# Patient Record
Sex: FEMALE | Race: WHITE | NOT HISPANIC OR LATINO
[De-identification: names, ages, dates, MRNs, and addresses within clinical notes are randomized per-mention and may not be internally consistent; named-entity substitution may affect disease eponyms.]

---

## 2022-09-25 ENCOUNTER — NON-APPOINTMENT (OUTPATIENT)
Age: 57
End: 2022-09-25

## 2022-09-26 ENCOUNTER — APPOINTMENT (OUTPATIENT)
Dept: BREAST CENTER | Facility: CLINIC | Age: 57
End: 2022-09-26

## 2022-09-26 VITALS
DIASTOLIC BLOOD PRESSURE: 72 MMHG | BODY MASS INDEX: 24.19 KG/M2 | HEART RATE: 87 BPM | HEIGHT: 59 IN | OXYGEN SATURATION: 99 % | WEIGHT: 120 LBS | SYSTOLIC BLOOD PRESSURE: 134 MMHG

## 2022-09-26 DIAGNOSIS — R92.2 INCONCLUSIVE MAMMOGRAM: ICD-10-CM

## 2022-09-26 DIAGNOSIS — R59.0 LOCALIZED ENLARGED LYMPH NODES: ICD-10-CM

## 2022-09-26 DIAGNOSIS — Z72.89 OTHER PROBLEMS RELATED TO LIFESTYLE: ICD-10-CM

## 2022-09-26 DIAGNOSIS — Z86.59 PERSONAL HISTORY OF OTHER MENTAL AND BEHAVIORAL DISORDERS: ICD-10-CM

## 2022-09-26 DIAGNOSIS — Z80.3 FAMILY HISTORY OF MALIGNANT NEOPLASM OF BREAST: ICD-10-CM

## 2022-09-26 DIAGNOSIS — Z78.9 OTHER SPECIFIED HEALTH STATUS: ICD-10-CM

## 2022-09-26 PROBLEM — Z00.00 ENCOUNTER FOR PREVENTIVE HEALTH EXAMINATION: Status: ACTIVE | Noted: 2022-09-26

## 2022-09-26 PROCEDURE — 99205 OFFICE O/P NEW HI 60 MIN: CPT

## 2022-09-26 RX ORDER — DOXEPIN HYDROCHLORIDE 75 MG/1
CAPSULE ORAL
Refills: 0 | Status: ACTIVE | COMMUNITY

## 2022-09-26 RX ORDER — VENLAFAXINE HCL 50 MG
TABLET ORAL
Refills: 0 | Status: ACTIVE | COMMUNITY

## 2022-09-26 NOTE — PHYSICAL EXAM
[Normocephalic] : normocephalic [Atraumatic] : atraumatic [EOMI] : extra ocular movement intact [Supple] : supple [No Supraclavicular Adenopathy] : no supraclavicular adenopathy [No Cervical Adenopathy] : no cervical adenopathy [Examined in the supine and seated position] : examined in the supine and seated position [No dominant masses] : no dominant masses in right breast  [No dominant masses] : no dominant masses left breast [No Nipple Retraction] : no left nipple retraction [No Nipple Discharge] : no left nipple discharge [Breast Mass Right Breast ___cm] : no masses [Breast Mass Left Breast ___cm] : no masses [Breast Nipple Inversion] : nipples not inverted [Breast Nipple Retraction] : nipples not retracted [Breast Nipple Flattening] : nipples not flattened [Breast Nipple Fissures] : nipples not fissured [Breast Abnormal Lactation (Galactorrhea)] : no galactorrhea [Breast Abnormal Secretion Bloody Fluid] : no bloody discharge [Breast Abnormal Secretion Serous Fluid] : no serous discharge [Breast Abnormal Secretion Opalescent Fluid] : no milky discharge [No Axillary Lymphadenopathy] : no left axillary lymphadenopathy [No Edema] : no edema [No Rashes] : no rashes [No Ulceration] : no ulceration [de-identified] : On exam, the patient has D-cup breasts with obvious subpectoral saline implant augmentations which were performed through a small inframammary incision.  On palpation, she just has some bruising changes over the recent biopsy site in the upper outer aspect of the left breast but no suspicious findings.  She has no axillary, supraclavicular, or cervical adenopathy. [de-identified] : Some bruising changes over the upper outer aspect of the left breast from her recent ultrasound core biopsy.

## 2022-09-26 NOTE — ASSESSMENT
[FreeTextEntry1] : The patient is a 57-year-old  postmenopausal white female of [place], [place], [place] descent.  She has no history of any hormone replacement therapy and underwent menopause at age 50.  She underwent menarche at age 12 and had her first child at age 28.  Her mother had breast cancer at age 48 and tested BRCA negative.  The patient herself was found to have dense breast on screening mammography on 2022 but ultrasound showed a suspicious left breast 1:00 irregular density adjacent to the implant of the capsule measuring 1.4 x 0.5 cm 5 cm from the nipple.  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  She then underwent an MRI on 2022 which showed some enhancement around the biopsy site measuring about 1.5 cm and questionable reactive lymph nodes in the left axilla.  She underwent a directed ultrasound on September 15, 2022 showing an indeterminant left axillary lymph node with some cortical thickening and she underwent an ultrasound-guided core biopsy on September 15, 2022 which was negative for metastatic disease.  The patient underwent comprehensive genetic panel testing which was negative and has already seen a Dr. Bobby Horn in [facility] for a hematology/oncology evaluation.  I reviewed her imaging on CD-ROM and indeed she did have this irregular lobulated density on ultrasound sitting directly over the muscle and capsule of the left implant.  MRI showed this to be localized in the lymph node biopsy turned out to be benign.  I reviewed the reports including the pathology.  On exam, she does have some bruising and scarring changes over the upper outer aspect of the left breast.  The patient has already seen a medical oncologist in [facility] and was given the option of neoadjuvant chemotherapy at that time since the node biopsy had not been performed.  Now that the node biopsy is negative she is not an absolute candidate for neoadjuvant chemotherapy but still may have some advantages with a neoadjuvant approach.  I spoke to the patient and her  at length and they understand the possible advantages of neoadjuvant chemotherapy which could make her available for subsequent treatment with further HER2 directed therapy if she does not get a complete pathologic response.  Neoadjuvant chemotherapy may also improve my surgical margins preventing reexcision's.  Given the location of the cancer I think she be an excellent candidate for exchange of the implant and performing the lumpectomy through the capsule from the inside through an inframammary approach.  She understands the need for a Jennifer  localization which would need to be performed prior to the procedure.  She understands the technique and benefits of a sentinel lymph node biopsy.  She also understands the need for radiation therapy with any breast conserving approach and understands the subsequent possible complications of implant contracture with radiation.  She understands at mastectomy could also be an option however I do not believe it is absolutely necessary in this situation and there would be an equal survival with mastectomy versus breast conservation and radiation in this case.  I would like to speak to her medical oncologist to see if a neoadjuvant approach could be performed here.  If a neoadjuvant chemotherapy regimen cannot be adequately established in the neoadjuvant setting in this case, I would have no problem with moving forward with upfront surgery.  She has an appointment to see a plastic surgeon at [facility] tomorrow and I also gave her Dr. Murcia and Dr. Lerman's cards if I were to perform the surgery.  The patient will move forward with the plastic surgery consultations and I will reach out to her medical oncologist who is currently on vacation to determine if a neoadjuvant chemotherapy approach could be used in this case.  All her questions were answered and I did return her imaging on CD-ROM back to the patient and will not perform a slide review unless she decides to move forward with surgery with myself.

## 2022-09-26 NOTE — HISTORY OF PRESENT ILLNESS
[FreeTextEntry1] : The patient is a 57-year-old  postmenopausal white female of [place], [place], Ma[place] descent.  She has no history of any hormone replacement therapy and underwent menopause at age 50.  She underwent menarche at age 12 and had her first child at age 28.  Her mother had breast cancer at age 48 and tested BRCA negative.  The patient herself was found to have dense breast on screening mammography on 2022 but ultrasound showed a suspicious left breast 1:00 irregular density adjacent to the implant of the capsule measuring 1.4 x 0.5 cm 5 cm from the nipple.  She underwent an ultrasound-guided core biopsy on 2022 at [facility] which showed a moderately differentiated invasive duct cancer which was ER weakly positive at 10%, AR negative and HER2 3+ by IHC with a Ki-67 between 75 and 85%.  She then underwent an MRI on 2022 which showed some enhancement around the biopsy site measuring about 1.5 cm and questionable reactive lymph nodes in the left axilla.  She underwent a directed ultrasound on September 15, 2022 showing an indeterminant left axillary lymph node with some cortical thickening and she underwent an ultrasound-guided core biopsy on September 15, 2022 which was negative for metastatic disease.  The patient underwent comprehensive genetic testing which was negative and has already seen a Dr. Bobby Horn in [facility] for a hematology/oncology evaluation and comes in now for a surgical second opinion.

## 2022-09-26 NOTE — ADDENDUM
[FreeTextEntry1] : I spent greater than 75% of the consultation in face-to-face counseling and coordination of care for this newly diagnosed left breast HER2 positive cancer.

## 2022-09-26 NOTE — REASON FOR VISIT
[Initial Evaluation] : an initial evaluation [FreeTextEntry1] : The patient comes in and is of [facility] [place] descent with a family history of breast cancer and a history of augmentation implants placed back in 2009.  She was diagnosed with a left breast 1:00 pericapsular invasive duct cancer in August 2022 which was ER weakly positive MD negative and HER2 3+ with a high Ki-67.  She comes in now for a surgical evaluation.

## 2022-09-27 ENCOUNTER — TRANSCRIPTION ENCOUNTER (OUTPATIENT)
Age: 57
End: 2022-09-27

## 2022-09-28 ENCOUNTER — NON-APPOINTMENT (OUTPATIENT)
Age: 57
End: 2022-09-28

## 2022-09-30 ENCOUNTER — TRANSCRIPTION ENCOUNTER (OUTPATIENT)
Age: 57
End: 2022-09-30

## 2022-10-02 ENCOUNTER — RESULT REVIEW (OUTPATIENT)
Age: 57
End: 2022-10-02

## 2023-01-03 ENCOUNTER — NON-APPOINTMENT (OUTPATIENT)
Age: 58
End: 2023-01-03

## 2023-01-17 ENCOUNTER — NON-APPOINTMENT (OUTPATIENT)
Age: 58
End: 2023-01-17

## 2023-01-20 ENCOUNTER — APPOINTMENT (OUTPATIENT)
Dept: BREAST CENTER | Facility: CLINIC | Age: 58
End: 2023-01-20
Payer: COMMERCIAL

## 2023-01-20 VITALS
OXYGEN SATURATION: 98 % | BODY MASS INDEX: 24.24 KG/M2 | WEIGHT: 120 LBS | SYSTOLIC BLOOD PRESSURE: 157 MMHG | DIASTOLIC BLOOD PRESSURE: 74 MMHG | HEART RATE: 87 BPM

## 2023-01-20 PROCEDURE — 99213 OFFICE O/P EST LOW 20 MIN: CPT

## 2023-01-20 NOTE — PHYSICAL EXAM
[Normocephalic] : normocephalic [Atraumatic] : atraumatic [EOMI] : extra ocular movement intact [Supple] : supple [No Supraclavicular Adenopathy] : no supraclavicular adenopathy [No Cervical Adenopathy] : no cervical adenopathy [Examined in the supine and seated position] : examined in the supine and seated position [No dominant masses] : no dominant masses in right breast  [No dominant masses] : no dominant masses left breast [No Nipple Retraction] : no left nipple retraction [No Nipple Discharge] : no left nipple discharge [Breast Mass Right Breast ___cm] : no masses [Breast Mass Left Breast ___cm] : no masses [No Axillary Lymphadenopathy] : no left axillary lymphadenopathy [No Edema] : no edema [No Rashes] : no rashes [No Ulceration] : no ulceration [Breast Nipple Inversion] : nipples not inverted [Breast Nipple Retraction] : nipples not retracted [Breast Nipple Flattening] : nipples not flattened [Breast Nipple Fissures] : nipples not fissured [Breast Abnormal Lactation (Galactorrhea)] : no galactorrhea [Breast Abnormal Secretion Bloody Fluid] : no bloody discharge [Breast Abnormal Secretion Serous Fluid] : no serous discharge [Breast Abnormal Secretion Opalescent Fluid] : no milky discharge [de-identified] : On exam, the patient has D-cup breasts with obvious subpectoral saline implant augmentations which were performed through a small inframammary incision.  On palpation, I cannot feel any suspicious findings even with close attention to the upper outer aspect of the left breast.  She has no axillary, supraclavicular, or cervical adenopathy.

## 2023-01-20 NOTE — ASSESSMENT
10/23 @11am Had received call from Christina from [facility] saying pt will have an appt tomorrow at 11am at their [place] office for teach & train. Informed Christina at 1pm that per  pt's now going home PO [email or web address]   [FreeTextEntry1] : The patient is a 57-year-old  postmenopausal white female of [place], [place], Maltese descent.  She has no history of any hormone replacement therapy and underwent menopause at age 50.  She underwent menarche at age 12 and had her first child at age 28.  Her mother had breast cancer at age 48 and tested BRCA negative.  The patient herself was found to have dense breast on screening mammography on 2022 but ultrasound showed a suspicious left breast 1:00 irregular density adjacent to the implant of the capsule measuring 1.4 x 0.5 cm 5 cm from the nipple.  She underwent an ultrasound-guided core biopsy on 2022 at [facility] which showed a moderately differentiated invasive duct cancer which was ER weakly positive at 10%, VT negative and HER2 3+ by IHC with a Ki-67 between 75 and 85%.  She then underwent an MRI on 2022 which showed some enhancement around the biopsy site measuring about 1.5 cm and questionable reactive lymph nodes in the left axilla.  She underwent a directed ultrasound on September 15, 2022 showing an indeterminant left axillary lymph node with some cortical thickening and she underwent an ultrasound-guided core biopsy on September 15, 2022 which was negative for metastatic disease.  The patient underwent comprehensive genetic panel testing which was negative and was seen by Dr. Bobby Horn in [facility] for a hematology/oncology evaluation.  I reviewed her imaging on CD-ROM and indeed she did have this irregular lobulated density on ultrasound sitting directly over the muscle and capsule of the left implant.  MRI showed this to be localized in the lymph node biopsy turned out to be benign.  I saw the patient in 2022 and felt that she would be a good candidate for neoadjuvant approach and then possible wide excision and exchange of the implant all performed through an inframammary incision.  I spoke to the oncologist in [place] and they agreed to a neoadjuvant Herceptin based chemotherapy approach which she just finished in 2023.  She had a follow-up MRI at [facility] on 2023 showing resolution of the known cancer in the left breast 1:00 region.  She comes in today for a presurgical treatment discussion.  On exam, I cannot feel any suspicious findings seen with close attention to the upper outer aspect of the left breast.  The patient has already seen Dr. Murcia for plastic surgery evaluation.  I had a full discussion with the patient regarding the option of a partial mastectomy which could be performed through the inframammary incision approach with exchange of the implant with plastic surgery.  She would need a localization preoperatively so this could be performed.  She understands the need for sentinel lymph node biopsy at the same setting.  The need for external beam radiation with any breast conservation was explained to the patient and she has a full understanding of this as well.  I did review the MRI from [facility] after her neoadjuvant chemotherapy which showed complete resolution of the known cancer on imaging. The patient would like to proceed with breast conservation with a lumpectomy and sentinel lymph node biopsy using this inframammary approach with exchange of the implant and this will be scheduled within the next month.  Dr. Murcia is also planning a reduction at the same sitting.  I will get a Jennifer  localization performed prior to the surgery.  All risk/benefits of the surgery were explained to the patient and she has a full understanding.  She understands the need for further HER2 based directed therapy postoperatively and will be referred back to her medical oncologist after surgery.

## 2023-01-20 NOTE — HISTORY OF PRESENT ILLNESS
[FreeTextEntry1] : The patient is a 57-year-old  postmenopausal white female of [place], [place], Ma[place] descent.  She has no history of any hormone replacement therapy and underwent menopause at age 50.  She underwent menarche at age 12 and had her first child at age 28.  Her mother had breast cancer at age 48 and tested BRCA negative.  The patient herself was found to have dense breast on screening mammography on 2022 but ultrasound showed a suspicious left breast 1:00 irregular density adjacent to the implant of the capsule measuring 1.4 x 0.5 cm 5 cm from the nipple.  She underwent an ultrasound-guided core biopsy on 2022 at [facility] which showed a moderately differentiated invasive duct cancer which was ER weakly positive at 10%, ND negative and HER2 3+ by IHC with a Ki-67 between 75 and 85%.  She then underwent an MRI on 2022 which showed some enhancement around the biopsy site measuring about 1.5 cm and questionable reactive lymph nodes in the left axilla.  She underwent a directed ultrasound on September 15, 2022 showing an indeterminant left axillary lymph node with some cortical thickening and she underwent an ultrasound-guided core biopsy on September 15, 2022 which was negative for metastatic disease.  The patient underwent comprehensive genetic testing which was negative. She underwent neoadjuvant Herceptin based chemotherapy with Dr. Bobby Horn in [facility] which finished in 2023.  Follow-up MRI performed at [facility] on 2023 showed resolution of the previously seen cancer in the left breast 1:00 region.  She comes in now for presurgical treatment planning.

## 2023-01-20 NOTE — REASON FOR VISIT
[Follow-Up: _____] : a [unfilled] follow-up visit [FreeTextEntry1] : The patient comes in and is of [place] descent with a family history of breast cancer and a history of augmentation implants placed back in 2009.  She was diagnosed with a left breast 1:00 pericapsular invasive duct cancer in August 2022 which was ER weakly positive SD negative and HER2 3+ with a high Ki-67.  She had a lower left axillary lymph node with cortical thickening which was biopsied and negative.  She underwent neoadjuvant HER2 based chemotherapy with a Dr. Horn in [place] and follow-up MRI showed interval resolution of the previous biopsy cancer in her left breast 1:00 region.  She is already seen Dr. Murcia for plastic surgery evaluation and comes in now for presurgical treatment discussion.

## 2023-01-29 ENCOUNTER — RESULT REVIEW (OUTPATIENT)
Age: 58
End: 2023-01-29

## 2023-02-03 ENCOUNTER — RESULT REVIEW (OUTPATIENT)
Age: 58
End: 2023-02-03

## 2023-02-03 ENCOUNTER — TRANSCRIPTION ENCOUNTER (OUTPATIENT)
Age: 58
End: 2023-02-03

## 2023-02-03 ENCOUNTER — APPOINTMENT (OUTPATIENT)
Dept: BREAST CENTER | Facility: HOSPITAL | Age: 58
End: 2023-02-03

## 2023-02-08 ENCOUNTER — NON-APPOINTMENT (OUTPATIENT)
Age: 58
End: 2023-02-08

## 2023-02-10 ENCOUNTER — APPOINTMENT (OUTPATIENT)
Dept: BREAST CENTER | Facility: CLINIC | Age: 58
End: 2023-02-10
Payer: COMMERCIAL

## 2023-02-10 DIAGNOSIS — C50.412 MALIGNANT NEOPLASM OF UPPER-OUTER QUADRANT OF LEFT FEMALE BREAST: ICD-10-CM

## 2023-02-10 DIAGNOSIS — Z17.0 MALIGNANT NEOPLASM OF UPPER-OUTER QUADRANT OF LEFT FEMALE BREAST: ICD-10-CM

## 2023-02-10 PROCEDURE — 99024 POSTOP FOLLOW-UP VISIT: CPT

## 2023-02-10 NOTE — ASSESSMENT
[FreeTextEntry1] : The patient is a 58-year-old  postmenopausal white female of [place], [place], [place] descent.  She has no history of any hormone replacement therapy and underwent menopause at age 50.  She underwent menarche at age 12 and had her first child at age 28.  Her mother had breast cancer at age 48 and tested BRCA negative.  She has a history of bilateral augmentation implants placed back in .  The patient herself was found to have dense breast on screening mammography on 2022 but ultrasound showed a suspicious left breast 1:00 irregular density adjacent to the implant of the capsule measuring 1.4 x 0.5 cm 5 cm from the nipple.  She underwent an ultrasound-guided core biopsy on 2022 at [facility] which showed a moderately differentiated invasive duct cancer which was ER weakly positive at 10%, AL negative and HER2 3+ by IHC with a Ki-67 between 75 and 85%.  She then underwent an MRI on 2022 which showed some enhancement around the biopsy site measuring about 1.5 cm and questionable reactive lymph nodes in the left axilla.  She underwent a directed ultrasound on September 15, 2022 showing an indeterminant left axillary lymph node with some cortical thickening and she underwent an ultrasound-guided core biopsy on September 15, 2022 which was negative for metastatic disease.  The patient underwent comprehensive genetic panel testing which was negative and was seen by Dr. Bobby Horn in [facility] for a hematology/oncology evaluation.  I reviewed her imaging on CD-ROM and indeed she did have this irregular lobulated density on ultrasound sitting directly over the muscle and capsule of the left implant.  MRI showed this to be localized in the lymph node biopsy turned out to be benign.  I saw the patient in 2022 and felt that she would be a good candidate for neoadjuvant approach and then possible wide excision and exchange of the implant all performed through an inframammary incision.  I spoke to the oncologist in [place] and they agreed to a neoadjuvant Herceptin based chemotherapy approach which she just finished in 2023.  She had a follow-up MRI at [facility] on 2023 showing resolution of the known cancer in the left breast 1:00 region.  She underwent a left breast partial mastectomy with sentinel lymph node biopsy and removal of her prior augmentation saline implants with placement of the left expander and right implant with reduction mastopexy by Dr. Murcia on February 3, 2023.  She did have a JOSE localization preoperatively but the device did not work and she required an ultrasound needle Localization with fluoroscopy confirmation intraoperatively.  I did place fiducials around the area of the known cancer posteriorly at the time of the surgery.  Final pathology showed a residual 3 foci of invasive carcinoma with the largest focus measuring only 2 mm.  She had 3 negative sentinel lymph nodes and margins were all negative.  This remains a pathologic prognostic stage IA breast cancer.  On exam today, she is healing well from her left breast partial mastectomy and sentinel lymph node biopsy with bilateral reduction mastopexies and replacement of expander on the left and implant on the right.  She did have a small seroma around the left breast upper outer quadrant and aspirated about 25 cc of some old blood under ultrasound guidance in the office today.  The patient understands the need to follow-up with her medical oncologist, Dr. Horn, in [place] for continued adjuvant HER2 directed therapy and will likely be switched to Kadcyla.  She understands the need for radiation oncology evaluation postoperatively and she is already contacted Dr. Mayi Church at [facility] for consultation. I would like to see her again in 2 weeks for another postop evaluation. Her next routine bilateral mammography and ultrasound will be due in 2023.

## 2023-02-10 NOTE — REASON FOR VISIT
[FreeTextEntry1] : The patient comes in and is of [place] descent with a family history of breast cancer and a history of augmentation implants placed back in 2009.  She was diagnosed with a left breast 1:00 pericapsular invasive duct cancer in August 2022 which was ER weakly positive NC negative and HER2 3+ with a high Ki-67.  She had a lower left axillary lymph node with cortical thickening which was biopsied and negative.  She underwent neoadjuvant HER2 based chemotherapy with a Dr. Horn in [place] and follow-up MRI showed interval resolution of the previous biopsy cancer in her left breast 1:00 region.  She was seen by Dr. Murcia and underwent a left breast partial mastectomy and sentinel lymph node biopsy with removal of her augmentation implants and placement of a right implant and left expander performed on February 3, 2023.  Final pathology did show a residual focus of cancer with the largest area measuring 2 mm and she had 3 negative sentinel lymph nodes and margins were all free.  This is a pathologic prognostic stage IA breast cancer after neoadjuvant chemotherapy.  She comes in now for postop follow-up.

## 2023-02-10 NOTE — HISTORY OF PRESENT ILLNESS
[FreeTextEntry1] : The patient is a 58-year-old  postmenopausal white female of [place], [place], [place] descent.  She has no history of any hormone replacement therapy and underwent menopause at age 50.  She underwent menarche at age 12 and had her first child at age 28.  Her mother had breast cancer at age 48 and tested BRCA negative.  She has a history of bilateral augmentation implants placed back in .  The patient herself was found to have dense breast on screening mammography on 2022 but ultrasound showed a suspicious left breast 1:00 irregular density adjacent to the implant of the capsule measuring 1.4 x 0.5 cm 5 cm from the nipple.  She underwent an ultrasound-guided core biopsy on 2022 at [facility] which showed a moderately differentiated invasive duct cancer which was ER weakly positive at 10%, NH negative and HER2 3+ by IHC with a Ki-67 between 75 and 85%.  She then underwent an MRI on 2022 which showed some enhancement around the biopsy site measuring about 1.5 cm and questionable reactive lymph nodes in the left axilla.  She underwent a directed ultrasound on September 15, 2022 showing an indeterminant left axillary lymph node with some cortical thickening and she underwent an ultrasound-guided core biopsy on September 15, 2022 which was negative for metastatic disease.  The patient underwent comprehensive genetic testing which was negative. She underwent neoadjuvant Herceptin based chemotherapy with Dr. Bobby Horn in [facility] which finished in 2023.  Follow-up MRI performed at [facility] on 2023 showed resolution of the previously seen cancer in the left breast 1:00 region.  She underwent a left breast partial mastectomy with sentinel lymph node biopsy and removal of her prior augmentation implants with placement of the left expander and right implant with reduction mastopexy by Dr. Murcia on February 3, 2023.  She did have a JOSE localization preoperatively but the device did not work and she required an ultrasound needle Localization with fluoroscopy confirmation intraoperatively.  Final pathology showed a residual 3 foci of invasive carcinoma with the largest focus measuring only 2 mm.  She had 3 negative sentinel lymph nodes and margins were all negative.  This remains a pathologic prognostic stage IA breast cancer.  She comes in now for postop follow-up.

## 2023-02-10 NOTE — PHYSICAL EXAM
[Normocephalic] : normocephalic [Atraumatic] : atraumatic [EOMI] : extra ocular movement intact [Supple] : supple [No Supraclavicular Adenopathy] : no supraclavicular adenopathy [No Cervical Adenopathy] : no cervical adenopathy [Examined in the supine and seated position] : examined in the supine and seated position [No dominant masses] : no dominant masses in right breast  [No dominant masses] : no dominant masses left breast [No Nipple Retraction] : no left nipple retraction [Breast Mass Right Breast ___cm] : no masses [No Nipple Discharge] : no left nipple discharge [Breast Mass Left Breast ___cm] : no masses [No Axillary Lymphadenopathy] : no left axillary lymphadenopathy [No Edema] : no edema [No Rashes] : no rashes [No Ulceration] : no ulceration [Breast Nipple Inversion] : nipples not inverted [Breast Nipple Retraction] : nipples not retracted [Breast Nipple Flattening] : nipples not flattened [Breast Nipple Fissures] : nipples not fissured [Breast Abnormal Lactation (Galactorrhea)] : no galactorrhea [Breast Abnormal Secretion Bloody Fluid] : no bloody discharge [Breast Abnormal Secretion Serous Fluid] : no serous discharge [Breast Abnormal Secretion Opalescent Fluid] : no milky discharge [de-identified] : On exam, the patient is doing well from her left breast partial mastectomy and sentinel lymph node biopsy with bilateral reduction mastopexy and exchange of her saline implants for a new right breast implant and left breast expander performed in February 2023.  Wounds are healing well.  I did aspirate about 25 cc of some old blood from around the upper outer aspect of the left breast under ultrasound guidance today. [de-identified] : Status post reduction mastopexy with removal of saline augmentation implant and replacement of silicone implant. [de-identified] : Status post partial mastectomy with sentinel lymph node biopsy and removal of saline augmentation implant with replacement of expander.  I did aspirate 25 cc of some old blood from around the upper outer aspect of the left breast under ultrasound guidance today.

## 2023-02-21 ENCOUNTER — APPOINTMENT (OUTPATIENT)
Dept: BREAST CENTER | Facility: CLINIC | Age: 58
End: 2023-02-21
Payer: COMMERCIAL

## 2023-02-21 PROCEDURE — 99024 POSTOP FOLLOW-UP VISIT: CPT

## 2023-02-21 NOTE — HISTORY OF PRESENT ILLNESS
[FreeTextEntry1] : The patient is a 58-year-old  postmenopausal white female of [place], Citizen of [place], [place] descent.  She has no history of any hormone replacement therapy and underwent menopause at age 50.  She underwent menarche at age 12 and had her first child at age 28.  Her mother had breast cancer at age 48 and tested BRCA negative.  She has a history of bilateral augmentation implants placed back in .  The patient herself was found to have dense breast on screening mammography on 2022 but ultrasound showed a suspicious left breast 1:00 irregular density adjacent to the implant of the capsule measuring 1.4 x 0.5 cm 5 cm from the nipple.  She underwent an ultrasound-guided core biopsy on 2022 at [facility] which showed a moderately differentiated invasive duct cancer which was ER weakly positive at 10%, [place] negative and HER2 3+ by IHC with a Ki-67 between 75 and 85%.  She then underwent an MRI on 2022 which showed some enhancement around the biopsy site measuring about 1.5 cm and questionable reactive lymph nodes in the left axilla.  She underwent a directed ultrasound on September 15, 2022 showing an indeterminant left axillary lymph node with some cortical thickening and she underwent an ultrasound-guided core biopsy on September 15, 2022 which was negative for metastatic disease.  The patient underwent comprehensive genetic testing which was negative. She underwent neoadjuvant Herceptin based chemotherapy with Dr. Bobby Horn in [facility] which finished in 2023.  Follow-up MRI performed at [facility] on 2023 showed resolution of the previously seen cancer in the left breast 1:00 region.  She underwent a left breast partial mastectomy with sentinel lymph node biopsy and removal of her prior augmentation implants with placement of the left expander and right implant with reduction mastopexy by Dr. Murcia on February 3, 2023.  She did have a JOSE localization preoperatively but the device did not work and she required an ultrasound needle Localization with fluoroscopy confirmation intraoperatively.  Final pathology showed a residual 3 foci of invasive carcinoma with the largest focus measuring only 2 mm.  She had 3 negative sentinel lymph nodes and margins were all negative.  This remained a pathologic prognostic stage IA breast cancer.  She did follow-up with Dr. Honr in [place] and [facility].  She would like to see Dr. Mayi Church in [facility] regarding radiation therapy.  She comes in now for postop follow-up.

## 2023-02-21 NOTE — ASSESSMENT
[FreeTextEntry1] : The patient is a 58-year-old  postmenopausal white female of [place], [place], Persian descent.  She has no history of any hormone replacement therapy and underwent menopause at age 50.  She underwent menarche at age 12 and had her first child at age 28.  Her mother had breast cancer at age 48 and tested BRCA negative.  She has a history of bilateral augmentation implants placed back in .  The patient herself was found to have dense breast on screening mammography on 2022 but ultrasound showed a suspicious left breast 1:00 irregular density adjacent to the implant of the capsule measuring 1.4 x 0.5 cm 5 cm from the nipple.  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  She then underwent an MRI on 2022 which showed some enhancement around the biopsy site measuring about 1.5 cm and questionable reactive lymph nodes in the left axilla.  She underwent a directed ultrasound on September 15, 2022 showing an indeterminant left axillary lymph node with some cortical thickening and she underwent an ultrasound-guided core biopsy on September 15, 2022 which was negative for metastatic disease.  The patient underwent comprehensive genetic panel testing which was negative and was seen by Dr. Bobby Horn in [facility] for a hematology/oncology evaluation.  I reviewed her imaging on CD-ROM and indeed she did have this irregular lobulated density on ultrasound sitting directly over the muscle and capsule of the left implant.  MRI showed this to be localized in the lymph node biopsy turned out to be benign.  I saw the patient in 2022 and felt that she would be a good candidate for neoadjuvant approach and then possible wide excision and exchange of the implant all performed through an inframammary incision.  I spoke to the oncologist in [place] and they agreed to a neoadjuvant Herceptin based chemotherapy approach which she just finished in 2023.  She had a follow-up MRI at [facility] on 2023 showing resolution of the known cancer in the left breast 1:00 region.  She underwent a left breast partial mastectomy with sentinel lymph node biopsy and removal of her prior augmentation saline implants with placement of the left expander and right implant with reduction mastopexy by Dr. Murcia on February 3, 2023.  She did have a JOSE localization preoperatively but the device did not work and she required an ultrasound needle Localization with fluoroscopy confirmation intraoperatively.  I did place fiducials around the area of the known cancer posteriorly at the time of the surgery.  Final pathology showed a residual 3 foci of invasive carcinoma with the largest focus measuring only 2 mm.  She had 3 negative sentinel lymph nodes and margins were all negative.  This remained a pathologic prognostic stage IA breast cancer.  On exam today, she is healing well from her left breast partial mastectomy and sentinel lymph node biopsy with bilateral reduction mastopexies and replacement of expander on the left and implant on the right.  She did have a small seroma around the left breast upper outer quadrant and aspirated about 18 cc of some bloody seroma under ultrasound guidance in the office today.  The patient did follow-up with her medical oncologist, Dr. Horn, in [place] for continued adjuvant HER2 directed therapy and has been placed on Kadcyla.  She understands the need for a radiation oncology evaluation postoperatively and she has already contacted Dr. Mayi Church at [facility] for consultation. I would like to see her again in 3 months for routine follow-up. Her next routine bilateral mammography and ultrasound will be due in 2023.  I did give her a letter in the office today stating that she has an expander so she can board a flight.

## 2023-02-21 NOTE — REASON FOR VISIT
[FreeTextEntry1] : The patient comes in and is of [place] Citizen of [place] Syri[place] descent with a family history of breast cancer and a history of augmentation implants placed back in 2009.  She was diagnosed with a left breast 1:00 pericapsular invasive duct cancer in August 2022 which was ER weakly positive [place] negative and HER2 3+ with a high Ki-67.  She had a lower left axillary lymph node with cortical thickening which was biopsied and negative.  She underwent neoadjuvant HER2 based chemotherapy with a Dr. Horn in [place] and follow-up MRI showed interval resolution of the previous biopsy cancer in her left breast 1:00 region.  She was seen by Dr. Murcia and underwent a left breast partial mastectomy and sentinel lymph node biopsy with removal of her augmentation implants and placement of a right implant and left expander performed on February 3, 2023.  Final pathology did show a residual focus of cancer with the largest area measuring 2 mm and she had 3 negative sentinel lymph nodes and margins were all free.  This is a pathologic prognostic stage IA breast cancer after neoadjuvant chemotherapy.  She comes in now for postop follow-up.

## 2023-02-21 NOTE — PHYSICAL EXAM
[Normocephalic] : normocephalic [Atraumatic] : atraumatic [EOMI] : extra ocular movement intact [Supple] : supple [No Supraclavicular Adenopathy] : no supraclavicular adenopathy [No Cervical Adenopathy] : no cervical adenopathy [Examined in the supine and seated position] : examined in the supine and seated position [No dominant masses] : no dominant masses in right breast  [No dominant masses] : no dominant masses left breast [No Nipple Retraction] : no left nipple retraction [No Nipple Discharge] : no left nipple discharge [Breast Mass Right Breast ___cm] : no masses [Breast Mass Left Breast ___cm] : no masses [No Axillary Lymphadenopathy] : no left axillary lymphadenopathy [No Edema] : no edema [No Rashes] : no rashes [No Ulceration] : no ulceration [Breast Nipple Inversion] : nipples not inverted [Breast Nipple Retraction] : nipples not retracted [Breast Nipple Flattening] : nipples not flattened [Breast Nipple Fissures] : nipples not fissured [Breast Abnormal Lactation (Galactorrhea)] : no galactorrhea [Breast Abnormal Secretion Bloody Fluid] : no bloody discharge [Breast Abnormal Secretion Serous Fluid] : no serous discharge [Breast Abnormal Secretion Opalescent Fluid] : no milky discharge [de-identified] : On exam, the patient is doing well from her left breast partial mastectomy and sentinel lymph node biopsy with bilateral reduction mastopexy and exchange of her saline implants for a new right breast implant and left breast expander performed in February 2023.  Wounds are healing well.  I did aspirate about 18 cc of some bloody seroma from around the upper outer aspect of the left breast under ultrasound guidance today. [de-identified] : Status post reduction mastopexy with removal of saline augmentation implant and replacement of silicone implant. [de-identified] : Status post partial mastectomy with sentinel lymph node biopsy and removal of saline augmentation implant with replacement of expander.  I did aspirate 18 cc of bloody seroma from around the upper outer aspect of the left breast under ultrasound guidance today.

## 2023-05-16 NOTE — HISTORY OF PRESENT ILLNESS
[FreeTextEntry1] : The patient is a 58-year-old  postmenopausal white female of [place], [place], Frisian descent.  She has no history of any hormone replacement therapy and underwent menopause at age 50.  She underwent menarche at age 12 and had her first child at age 28.  Her mother had breast cancer at age 48 and tested BRCA negative.  She has a history of bilateral augmentation implants placed back in .  The patient herself was found to have dense breast on screening mammography on 2022 but ultrasound showed a suspicious left breast 1:00 irregular density adjacent to the implant of the capsule measuring 1.4 x 0.5 cm 5 cm from the nipple.  She underwent an ultrasound-guided core biopsy on 2022 at [facility] which showed a moderately differentiated invasive duct cancer which was ER weakly positive at 10%, [place] negative and HER2 3+ by IHC with a Ki-67 between 75 and 85%.  She then underwent an MRI on 2022 which showed some enhancement around the biopsy site measuring about 1.5 cm and questionable reactive lymph nodes in the left axilla.  She underwent a directed ultrasound on September 15, 2022 showing an indeterminant left axillary lymph node with some cortical thickening and she underwent an ultrasound-guided core biopsy on September 15, 2022 which was negative for metastatic disease.  The patient underwent comprehensive genetic testing which was negative. She underwent neoadjuvant Herceptin based chemotherapy with Dr. Bobby Horn in [facility] which finished in 2023.  Follow-up MRI performed at [facility] on 2023 showed resolution of the previously seen cancer in the left breast 1:00 region.  She underwent a left breast partial mastectomy with sentinel lymph node biopsy and removal of her prior augmentation implants with placement of the left expander and right implant with reduction mastopexy by Dr. Murcia on February 3, 2023.  She did have a JOSE localization preoperatively but the device did not work and she required an ultrasound needle Localization with fluoroscopy confirmation intraoperatively.  Final pathology showed a residual 3 foci of invasive carcinoma with the largest focus measuring only 2 mm.  She had 3 negative sentinel lymph nodes and margins were all negative.  This remained a pathologic prognostic stage IA breast cancer.  She did follow-up with Dr. Horn in [place] and was switched to Kadcyla postoperatively and was also placed on ??????.  She underwent external beam radiation to the left breast by Dr. Mayi Church in [facility]. ????? expanders exchanged ??????  She comes in now for routine follow-up.

## 2023-05-16 NOTE — ASSESSMENT
[FreeTextEntry1] : The patient is a 58-year-old  postmenopausal white female of [place], [place], [place] descent.  She has no history of any hormone replacement therapy and underwent menopause at age 50.  She underwent menarche at age 12 and had her first child at age 28.  Her mother had breast cancer at age 48 and tested BRCA negative.  She has a history of bilateral augmentation implants placed back in .  The patient herself was found to have dense breast on screening mammography on 2022 but ultrasound showed a suspicious left breast 1:00 irregular density adjacent to the implant of the capsule measuring 1.4 x 0.5 cm 5 cm from the nipple.  She underwent an ultrasound-guided core biopsy on 2022 at [facility] which showed a moderately differentiated invasive duct cancer which was ER weakly positive at 10%, [place] negative and HER2 3+ by IHC with a Ki-67 between 75 and 85%.  She then underwent an MRI on 2022 which showed some enhancement around the biopsy site measuring about 1.5 cm and questionable reactive lymph nodes in the left axilla.  She underwent a directed ultrasound on September 15, 2022 showing an indeterminant left axillary lymph node with some cortical thickening and she underwent an ultrasound-guided core biopsy on September 15, 2022 which was negative for metastatic disease.  The patient underwent comprehensive genetic panel testing which was negative and was seen by Dr. Bobby Horn in [facility] for a hematology/oncology evaluation.  I reviewed her imaging on CD-ROM and indeed she did have this irregular lobulated density on ultrasound sitting directly over the muscle and capsule of the left implant.  MRI showed this to be localized and the lymph node biopsy turned out to be benign.  I saw the patient in 2022 and felt that she would be a good candidate for a neoadjuvant approach and then possible wide excision and exchange of the implant all performed through an inframammary incision.  I spoke to the oncologist in [place] and they agreed to a neoadjuvant Herceptin based chemotherapy approach which she finished in 2023.  She had a follow-up MRI at [facility] on 2023 showing resolution of the known cancer in the left breast 1:00 region.  She underwent a left breast partial mastectomy with sentinel lymph node biopsy and removal of her prior augmentation saline implants with placement of the left expander and right implant with reduction mastopexy by Dr. Murcia on February 3, 2023.  She did have a JOSE localization preoperatively but the device did not work and she required an ultrasound needle Localization with fluoroscopy confirmation intraoperatively.  I did place fiducials around the area of the known cancer posteriorly at the time of the surgery.  Final pathology showed a residual 3 foci of invasive carcinoma with the largest focus measuring only 2 mm.  She had 3 negative sentinel lymph nodes and margins were all negative.  This remained a pathologic prognostic stage IA breast cancer.  The patient did follow-up with her medical oncologist, Dr. Horn, in [place] for continued adjuvant HER2 directed therapy and has been placed on Kadcyla and is also on ???????.  She understood the need for a radiation oncology evaluation postoperatively and she received external beam radiation with Dr. Mayi Church at [facility] which finished in ????????. On exam today, she has healed well from her left breast partial mastectomy and sentinel lymph node biopsy with bilateral reduction mastopexies and replacement of expander on the left and implant on the right and she has no evidence of recurrence. ??? exchange of her expanders ?????  I would like to see her again in 6 months for routine follow-up. Her next routine bilateral mammography and ultrasound will be due in 2023.

## 2023-05-16 NOTE — REASON FOR VISIT
[Follow-Up: _____] : a [unfilled] follow-up visit [FreeTextEntry1] : The patient comes in and is of [place] Mong[place]ian descent with a family history of breast cancer and a history of augmentation implants placed back in 2009.  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  She had a lower left axillary lymph node with cortical thickening which was biopsied and negative.  She underwent neoadjuvant HER2 based chemotherapy with a Dr. Horn in [place] and follow-up MRI showed interval resolution of the previous biopsy cancer in her left breast 1:00 region.  She was seen by Dr. Murcia and underwent a left breast partial mastectomy and sentinel lymph node biopsy with removal of her augmentation implants and placement of a right implant and left expander performed on February 3, 2023.  Final pathology did show a residual focus of cancer with the largest area measuring 2 mm and she had 3 negative sentinel lymph nodes and margins were all free.  This was a pathologic prognostic stage IA breast cancer after neoadjuvant chemotherapy.  She was placed on Kadcyla by her medical oncologist postoperatively and received external beam radiation with Dr. Mayi Church at [facility].  She comes in now for routine follow-up.

## 2023-05-16 NOTE — PAST MEDICAL HISTORY
[Menarche Age ____] : age at menarche was [unfilled] [History of Hormone Replacement Treatment] : has no history of hormone replacement treatment [Total Preg ___] : G[unfilled] [Live Births ___] : P[unfilled]  [Premature ___] : Premature: [unfilled] [Age At Live Birth ___] : Age at live birth: [unfilled]

## 2023-05-23 ENCOUNTER — APPOINTMENT (OUTPATIENT)
Dept: BREAST CENTER | Facility: CLINIC | Age: 58
End: 2023-05-23
Payer: COMMERCIAL

## 2023-05-24 ENCOUNTER — NON-APPOINTMENT (OUTPATIENT)
Age: 58
End: 2023-05-24

## 2023-05-24 NOTE — REASON FOR VISIT
[Follow-Up: _____] : a [unfilled] follow-up visit [FreeTextEntry1] : The patient comes in and is of [place] descent with a family history of breast cancer and a history of augmentation implants placed back in 2009.  She was diagnosed with a left breast 1:00 pericapsular invasive duct cancer in August 2022 which was ER weakly positive [place] negative and HER2 3+ with a high Ki-67.  She had a lower left axillary lymph node with cortical thickening which was biopsied and negative.  She underwent neoadjuvant HER2 based chemotherapy with a Dr. Horn in [place] and follow-up MRI showed interval resolution of the previous biopsy cancer in her left breast 1:00 region.  She was seen by Dr. Murcia and underwent a left breast partial mastectomy and sentinel lymph node biopsy with removal of her augmentation implants and placement of a right implant and left expander performed on February 3, 2023.  Final pathology did show a residual focus of cancer with the largest area measuring 2 mm and she had 3 negative sentinel lymph nodes and margins were all free.  This was a pathologic prognostic stage IA breast cancer after neoadjuvant chemotherapy.  She was placed on Kadcyla by her medical oncologist postoperatively and received external beam radiation with Dr. Mayi Church at [facility].  She comes in now for routine follow-up.

## 2023-05-24 NOTE — PHYSICAL EXAM
[Normocephalic] : normocephalic [Atraumatic] : atraumatic [EOMI] : extra ocular movement intact [Supple] : supple [No Supraclavicular Adenopathy] : no supraclavicular adenopathy [No Cervical Adenopathy] : no cervical adenopathy [Examined in the supine and seated position] : examined in the supine and seated position [No dominant masses] : no dominant masses in right breast  [No dominant masses] : no dominant masses left breast [No Nipple Retraction] : no left nipple retraction [No Nipple Discharge] : no left nipple discharge [Breast Mass Right Breast ___cm] : no masses [Breast Mass Left Breast ___cm] : no masses [No Axillary Lymphadenopathy] : no left axillary lymphadenopathy [No Edema] : no edema [No Rashes] : no rashes [No Ulceration] : no ulceration [Breast Nipple Inversion] : nipples not inverted [Breast Nipple Retraction] : nipples not retracted [Breast Nipple Flattening] : nipples not flattened [Breast Nipple Fissures] : nipples not fissured [Breast Abnormal Lactation (Galactorrhea)] : no galactorrhea [Breast Abnormal Secretion Bloody Fluid] : no bloody discharge [Breast Abnormal Secretion Opalescent Fluid] : no milky discharge [Breast Abnormal Secretion Serous Fluid] : no serous discharge [de-identified] : On exam, the patient has done well from her left breast partial mastectomy and sentinel lymph node biopsy with bilateral reduction mastopexy and exchange of her saline implants for a new right breast implant and left breast expander performed in February 2023.  Her wounds of healed well and she has no evidence of recurrence in the left breast.  She has no axillary, supraclavicular, or cervical adenopathy. [de-identified] : Status post reduction mastopexy with removal of saline augmentation implant and replacement of silicone implant. [de-identified] : Status post partial mastectomy with sentinel lymph node biopsy and removal of saline augmentation implant with replacement of expander. ???? exchange of expander ??????

## 2023-05-24 NOTE — HISTORY OF PRESENT ILLNESS
[FreeTextEntry1] : The patient is a 58-year-old  postmenopausal white female of [place], [place], [place] descent.  She has no history of any hormone replacement therapy and underwent menopause at age 50.  She underwent menarche at age 12 and had her first child at age 28.  Her mother had breast cancer at age 48 and tested BRCA negative.  She has a history of bilateral augmentation implants placed back in .  The patient herself was found to have dense breast on screening mammography on 2022 but ultrasound showed a suspicious left breast 1:00 irregular density adjacent to the implant of the capsule measuring 1.4 x 0.5 cm 5 cm from the nipple.  She underwent an ultrasound-guided core biopsy on 2022 at [facility] which showed a moderately differentiated invasive duct cancer which was ER weakly positive at 10%, NH negative and HER2 3+ by IHC with a Ki-67 between 75 and 85%.  She then underwent an MRI on 2022 which showed some enhancement around the biopsy site measuring about 1.5 cm and questionable reactive lymph nodes in the left axilla.  She underwent a directed ultrasound on September 15, 2022 showing an indeterminant left axillary lymph node with some cortical thickening and she underwent an ultrasound-guided core biopsy on September 15, 2022 which was negative for metastatic disease.  The patient underwent comprehensive genetic testing which was negative. She underwent neoadjuvant Herceptin based chemotherapy with Dr. Bobby Horn in [facility] [place] which finished in 2023.  Follow-up MRI performed at [facility] on 2023 showed resolution of the previously seen cancer in the left breast 1:00 region.  She underwent a left breast partial mastectomy with sentinel lymph node biopsy and removal of her prior augmentation implants with placement of the left expander and right implant with reduction mastopexy by Dr. Murcia on February 3, 2023.  She did have a JOSE localization preoperatively but the device did not work and she required an ultrasound needle Localization with fluoroscopy confirmation intraoperatively.  Final pathology showed a residual 3 foci of invasive carcinoma with the largest focus measuring only 2 mm.  She had 3 negative sentinel lymph nodes and margins were all negative.  This remained a pathologic prognostic stage IA breast cancer.  She did follow-up with Dr. Horn in [place] and was switched to Kadcyla postoperatively and was also placed on ??????.  She underwent external beam radiation to the left breast by Dr. Mayi Church in [facility]. ????? expanders exchanged ??????  She comes in now for routine follow-up.

## 2023-05-24 NOTE — ASSESSMENT
[FreeTextEntry1] : The patient is a 58-year-old  postmenopausal white female of [place], [place], Greek descent.  She has no history of any hormone replacement therapy and underwent menopause at age 50.  She underwent menarche at age 12 and had her first child at age 28.  Her mother had breast cancer at age 48 and tested BRCA negative.  She has a history of bilateral augmentation implants placed back in .  The patient herself was found to have dense breast on screening mammography on 2022 but ultrasound showed a suspicious left breast 1:00 irregular density adjacent to the implant of the capsule measuring 1.4 x 0.5 cm 5 cm from the nipple.  She underwent an ultrasound-guided core biopsy on 2022 at [facility] which showed a moderately differentiated invasive duct cancer which was ER weakly positive at 10%, PA negative and HER2 3+ by IHC with a Ki-67 between 75 and 85%.  She then underwent an MRI on 2022 which showed some enhancement around the biopsy site measuring about 1.5 cm and questionable reactive lymph nodes in the left axilla.  She underwent a directed ultrasound on September 15, 2022 showing an indeterminant left axillary lymph node with some cortical thickening and she underwent an ultrasound-guided core biopsy on September 15, 2022 which was negative for metastatic disease.  The patient underwent comprehensive genetic panel testing which was negative and was seen by Dr. Bobby Horn in [facility] for a hematology/oncology evaluation.  I reviewed her imaging on CD-ROM and indeed she did have this irregular lobulated density on ultrasound sitting directly over the muscle and capsule of the left implant.  MRI showed this to be localized and the lymph node biopsy turned out to be benign.  I saw the patient in 2022 and felt that she would be a good candidate for a neoadjuvant approach and then possible wide excision and exchange of the implant all performed through an inframammary incision.  I spoke to the oncologist in [place] and they agreed to a neoadjuvant Herceptin based chemotherapy approach which she finished in 2023.  She had a follow-up MRI at [facility] on 2023 showing resolution of the known cancer in the left breast 1:00 region.  She underwent a left breast partial mastectomy with sentinel lymph node biopsy and removal of her prior augmentation saline implants with placement of the left expander and right implant with reduction mastopexy by Dr. Murcia on February 3, 2023.  She did have a JOSE localization preoperatively but the device did not work and she required an ultrasound needle Localization with fluoroscopy confirmation intraoperatively.  I did place fiducials around the area of the known cancer posteriorly at the time of the surgery.  Final pathology showed a residual 3 foci of invasive carcinoma with the largest focus measuring only 2 mm.  She had 3 negative sentinel lymph nodes and margins were all negative.  This remained a pathologic prognostic stage IA breast cancer.  The patient did follow-up with her medical oncologist, Dr. Horn, in [place] for continued adjuvant HER2 directed therapy and has been placed on Kadcyla and is also on ???????.  She understood the need for a radiation oncology evaluation postoperatively and she received external beam radiation with Dr. Mayi Church at [facility] which finished in ????????. On exam today, she has healed well from her left breast partial mastectomy and sentinel lymph node biopsy with bilateral reduction mastopexies and replacement of expander on the left and implant on the right and she has no evidence of recurrence. ??? exchange of her expanders ?????  I would like to see her again in 6 months for routine follow-up. Her next routine bilateral mammography and ultrasound will be due in 2023.

## 2023-06-01 ENCOUNTER — APPOINTMENT (OUTPATIENT)
Dept: BREAST CENTER | Facility: CLINIC | Age: 58
End: 2023-06-01
Payer: COMMERCIAL

## 2023-06-01 VITALS
SYSTOLIC BLOOD PRESSURE: 132 MMHG | WEIGHT: 123 LBS | OXYGEN SATURATION: 99 % | HEART RATE: 101 BPM | BODY MASS INDEX: 24.84 KG/M2 | DIASTOLIC BLOOD PRESSURE: 67 MMHG

## 2023-06-01 PROCEDURE — 99213 OFFICE O/P EST LOW 20 MIN: CPT

## 2023-06-01 NOTE — REASON FOR VISIT
[Follow-Up: _____] : a [unfilled] follow-up visit [FreeTextEntry1] : The patient comes in and is of [place] descent with a family history of breast cancer and a history of augmentation implants placed back in 2009.  She was diagnosed with a left breast 1:00 pericapsular invasive duct cancer in August 2022 which was ER weakly positive HI negative and HER2 3+ with a high Ki-67.  She had a lower left axillary lymph node with cortical thickening which was biopsied and negative.  She underwent neoadjuvant HER2 based chemotherapy with a Dr. Horn in [place] and follow-up MRI showed interval resolution of the previous biopsy cancer in her left breast 1:00 region.  She was seen by Dr. Murcia and underwent a left breast partial mastectomy and sentinel lymph node biopsy with removal of her augmentation implants and placement of a right implant and left expander performed on February 3, 2023.  Final pathology did show a residual focus of cancer with the largest area measuring 2 mm and she had 3 negative sentinel lymph nodes and margins were all free.  This was a pathologic prognostic stage IA breast cancer after neoadjuvant chemotherapy.  She was placed on Kadcyla by her medical oncologist postoperatively and received external beam radiation with Dr. Mayi Church at [facility].  She comes in now for routine follow-up.

## 2023-06-01 NOTE — PHYSICAL EXAM
[Normocephalic] : normocephalic [Atraumatic] : atraumatic [EOMI] : extra ocular movement intact [Supple] : supple [No Supraclavicular Adenopathy] : no supraclavicular adenopathy [No Cervical Adenopathy] : no cervical adenopathy [Examined in the supine and seated position] : examined in the supine and seated position [No dominant masses] : no dominant masses in right breast  [No dominant masses] : no dominant masses left breast [No Nipple Retraction] : no left nipple retraction [No Nipple Discharge] : no left nipple discharge [Breast Mass Right Breast ___cm] : no masses [Breast Mass Left Breast ___cm] : no masses [No Axillary Lymphadenopathy] : no left axillary lymphadenopathy [No Edema] : no edema [No Rashes] : no rashes [No Ulceration] : no ulceration [Breast Nipple Inversion] : nipples not inverted [Breast Nipple Retraction] : nipples not retracted [Breast Nipple Flattening] : nipples not flattened [Breast Nipple Fissures] : nipples not fissured [Breast Abnormal Lactation (Galactorrhea)] : no galactorrhea [Breast Abnormal Secretion Bloody Fluid] : no bloody discharge [Breast Abnormal Secretion Serous Fluid] : no serous discharge [Breast Abnormal Secretion Opalescent Fluid] : no milky discharge [de-identified] : On exam, the patient has done well from her left breast partial mastectomy and sentinel lymph node biopsy with bilateral reduction mastopexy and exchange of her saline implants for a new right breast implant and left breast expander performed in February 2023.  Her wounds of healed well and she has no evidence of recurrence in the left breast and has an excellent result after external beam radiation.  She does have some slight erythema in the periareolar wounds from some spitting sutures but I cannot see any residual suture material today on exam.  She has no axillary, supraclavicular, or cervical adenopathy. [de-identified] : Status post reduction mastopexy with removal of saline augmentation implant and replacement of silicone implant.  Mild erythema in the periareolar wound from previous spitting suture. [de-identified] : Status post partial mastectomy with sentinel lymph node biopsy and removal of saline augmentation implant with replacement of expander.  No evidence of recurrence over the expander which she needs to leave in place till after her Kadcyla is finished in January 2024.  Slight erythema and periareolar wound from a spitting suture.

## 2023-06-01 NOTE — HISTORY OF PRESENT ILLNESS
[FreeTextEntry1] : The patient is a 58-year-old  postmenopausal white female of [place], [place], [place] sor[place] descent.  She has no history of any hormone replacement therapy and underwent menopause at age 50.  She underwent menarche at age 12 and had her first child at age 28.  Her mother had breast cancer at age 48 and tested BRCA negative.  She has a history of bilateral augmentation implants placed back in .  The patient herself was found to have dense breast on screening mammography on 2022 but ultrasound showed a suspicious left breast 1:00 irregular density adjacent to the implant of the capsule measuring 1.4 x 0.5 cm 5 cm from the nipple.  She underwent an ultrasound-guided core biopsy on 2022 at [facility] which showed a moderately differentiated invasive duct cancer which was ER weakly positive at 10%, NC negative and HER2 3+ by IHC with a Ki-67 between 75 and 85%.  She then underwent an MRI on 2022 which showed some enhancement around the biopsy site measuring about 1.5 cm and questionable reactive lymph nodes in the left axilla.  She underwent a directed ultrasound on September 15, 2022 showing an indeterminant left axillary lymph node with some cortical thickening and she underwent an ultrasound-guided core biopsy on September 15, 2022 which was negative for metastatic disease.  The patient underwent comprehensive genetic testing which was negative. She underwent neoadjuvant Herceptin based chemotherapy with Dr. Bobby Horn in [facility] which finished in 2023.  Follow-up MRI performed at [facility] on 2023 showed resolution of the previously seen cancer in the left breast 1:00 region.  She underwent a left breast partial mastectomy with sentinel lymph node biopsy and removal of her prior augmentation implants with placement of the left expander and right implant with reduction mastopexy by Dr. Murcia on February 3, 2023.  She did have a JOSE localization preoperatively but the device did not work and she required an ultrasound needle Localization with fluoroscopy confirmation intraoperatively.  Final pathology showed a residual 3 foci of invasive carcinoma with the largest focus measuring only 2 mm.  She had 3 negative sentinel lymph nodes and margins were all negative.  This remained a pathologic prognostic stage IA breast cancer.  She did follow-up with Dr. Horn in [place] and was switched to KaWYyla postoperatively..  She underwent external beam radiation to the left breast by Dr. Mayi Church in [facility] which finished on 2023.  She still has her left expander in place which will be left until she finishes the Kadcyla in 2024.  She comes in now for routine follow-up.

## 2023-06-01 NOTE — ASSESSMENT
[FreeTextEntry1] : The patient is a 58-year-old  postmenopausal white female of [place], [place], [place] descent.  She has no history of any hormone replacement therapy and underwent menopause at age 50.  She underwent menarche at age 12 and had her first child at age 28.  Her mother had breast cancer at age 48 and tested BRCA negative.  She has a history of bilateral augmentation implants placed back in .  The patient herself was found to have dense breast on screening mammography on 2022 but ultrasound showed a suspicious left breast 1:00 irregular density adjacent to the implant of the capsule measuring 1.4 x 0.5 cm 5 cm from the nipple.  She underwent an ultrasound-guided core biopsy on 2022 at [facility] which showed a moderately differentiated invasive duct cancer which was ER weakly positive at 10%, FL negative and HER2 3+ by IHC with a Ki-67 between 75 and 85%.  She then underwent an MRI on 2022 which showed some enhancement around the biopsy site measuring about 1.5 cm and questionable reactive lymph nodes in the left axilla.  She underwent a directed ultrasound on September 15, 2022 showing an indeterminant left axillary lymph node with some cortical thickening and she underwent an ultrasound-guided core biopsy on September 15, 2022 which was negative for metastatic disease.  The patient underwent comprehensive genetic panel testing which was negative and was seen by Dr. Bobby Horn in [facility] for a hematology/oncology evaluation.  I reviewed her imaging on CD-ROM and indeed she did have this irregular lobulated density on ultrasound sitting directly over the muscle and capsule of the left implant.  MRI showed this to be localized and the lymph node biopsy turned out to be benign.  I saw the patient in 2022 and felt that she would be a good candidate for a neoadjuvant approach and then possible wide excision and exchange of the implant all performed through an inframammary incision.  I spoke to the oncologist in [place] and they agreed to a neoadjuvant Herceptin based chemotherapy approach which she finished in 2023.  She had a follow-up MRI at [facility] on 2023 showing resolution of the known cancer in the left breast 1:00 region.  She underwent a left breast partial mastectomy with sentinel lymph node biopsy and removal of her prior augmentation saline implants with placement of the left expander and right implant with reduction mastopexy by Dr. Murcia on February 3, 2023.  She did have a JOSE localization preoperatively but the device did not work and she required an ultrasound needle Localization with fluoroscopy confirmation intraoperatively.  I did place fiducials around the area of the known cancer posteriorly at the time of the surgery.  Final pathology showed a residual 3 foci of invasive carcinoma with the largest focus measuring only 2 mm.  She had 3 negative sentinel lymph nodes and margins were all negative.  This remained a pathologic prognostic stage IA breast cancer.  The patient did follow-up with her medical oncologist, Dr. Horn, in [place] for continued adjuvant HER2 directed therapy and has been placed on Kadcyla.  She understood the need for a radiation oncology evaluation postoperatively and she received external beam radiation with Dr. Mayi Church at [facility] which finished on 2023. On exam today, she has healed well from her left breast partial mastectomy and sentinel lymph node biopsy with bilateral reduction mastopexies and replacement of expander on the left and implant on the right and she has no evidence of recurrence with an excellent cosmetic result after external beam radiation.  She continues have the expander on the left side which will be left until she finishes the Kadcyla around 2024.  I would like to see her again in 6 months for routine follow-up. Her next routine bilateral mammography and ultrasound will be due in 2023 and she was given prescriptions.

## 2023-12-07 ENCOUNTER — NON-APPOINTMENT (OUTPATIENT)
Age: 58
End: 2023-12-07

## 2023-12-16 NOTE — ASSESSMENT
[FreeTextEntry1] : The patient is a 58-year-old  postmenopausal white female of [place], [place], Mong[place]ian descent. She has no history of any hormone replacement therapy and underwent menopause at age 50. She underwent menarche at age 12 and had her first child at age 28. Her mother had breast cancer at age 48 and tested BRCA negative. She has a history of bilateral augmentation implants placed back in . The patient herself was found to have dense breasts on screening mammography on 2022 but ultrasound showed a suspicious left breast 1:00 irregular density adjacent to the implant of the capsule measuring 1.4 x 0.5 cm 5 cm from the nipple. She underwent an ultrasound-guided core biopsy on 2022 at [facility] which showed a moderately differentiated invasive duct cancer which was ER weakly positive at 10%, IL negative and HER2 3+ by IHC with a Ki-67 between 75 and 85%. She then underwent an MRI on 2022 which showed some enhancement around the biopsy site measuring about 1.5 cm and questionable reactive lymph nodes in the left axilla. She underwent a directed ultrasound on September 15, 2022 showing an indeterminant left axillary lymph node with some cortical thickening and she underwent an ultrasound-guided core biopsy on September 15, 2022 which was negative for metastatic disease. The patient underwent comprehensive genetic panel testing which was negative and was seen by Dr. Bobby Horn in [facility] for a hematology/oncology evaluation. I reviewed her imaging on CD-ROM and indeed she did have this irregular lobulated density on ultrasound sitting directly over the muscle and capsule of the left implant. MRI showed this to be localized and the lymph node biopsy turned out to be benign. I saw the patient in 2022 and felt that she would be a good candidate for a neoadjuvant approach and then possible wide excision and exchange of the implant all performed through an inframammary incision. I spoke to the oncologist in [place] and they agreed to a neoadjuvant Herceptin based chemotherapy approach which she finished in 2023. She had a follow-up MRI at [facility] on 2023 showing resolution of the known cancer in the left breast 1:00 region. She underwent a left breast partial mastectomy with sentinel lymph node biopsy and removal of her prior augmentation saline implants with placement of the left expander and right implant with reduction mastopexy by Dr. Murcia on February 3, 2023. She did have a JOSE localization preoperatively but the device did not work and she required an ultrasound needle Localization with fluoroscopy confirmation intraoperatively. I did place fiducials around the area of the known cancer posteriorly at the time of the surgery. Final pathology showed a residual 3 foci of invasive carcinoma with the largest focus measuring only 2 mm. She had 3 negative sentinel lymph nodes and margins were all negative. This remained a pathologic prognostic stage IA breast cancer. The patient did follow-up with her medical oncologist, Dr. Horn, in [place] for continued adjuvant HER2 directed therapy and received Kadcyla. She understood the need for a radiation oncology evaluation postoperatively and she received external beam radiation with Dr. Mayi Church at [facility] which finished on 2023.  She underwent her last bilateral mammography and ultrasound which was reviewed from ???????? due in august ????????. On exam today, she has healed well from her left breast partial mastectomy and sentinel lymph node biopsy with bilateral reduction mastopexies and replacement of expander on the left and implant on the right and she has no evidence of recurrence with an excellent cosmetic result after external beam radiation. She continues have the expander on the left side which will be left until she finishes the Kadcyla around 2024. I would like to see her again in 6 months for routine follow-up. Her next routine bilateral mammography and ultrasound will be due in ??????? 2024 and she was given prescriptions.

## 2023-12-16 NOTE — HISTORY OF PRESENT ILLNESS
[FreeTextEntry1] : The patient is a 58-year-old  postmenopausal white female of [place], [place], Romanian descent.  She has no history of any hormone replacement therapy and underwent menopause at age 50.  She underwent menarche at age 12 and had her first child at age 28.  Her mother had breast cancer at age 48 and tested BRCA negative.  She has a history of bilateral augmentation implants placed back in .  The patient herself was found to have dense breast on screening mammography on 2022 but ultrasound showed a suspicious left breast 1:00 irregular density adjacent to the implant of the capsule measuring 1.4 x 0.5 cm 5 cm from the nipple.  She underwent an ultrasound-guided core biopsy on 2022 at [facility] which showed a moderately differentiated invasive duct cancer which was ER weakly positive at 10%, AK negative and HER2 3+ by IHC with a Ki-67 between 75 and 85%.  She then underwent an MRI on 2022 which showed some enhancement around the biopsy site measuring about 1.5 cm and questionable reactive lymph nodes in the left axilla.  She underwent a directed ultrasound on September 15, 2022 showing an indeterminant left axillary lymph node with some cortical thickening and she underwent an ultrasound-guided core biopsy on September 15, 2022 which was negative for metastatic disease.  The patient underwent comprehensive genetic testing which was negative. She underwent neoadjuvant Herceptin based chemotherapy with Dr. Bobby Horn in [facility] which finished in 2023.  Follow-up MRI performed at [facility] on 2023 showed resolution of the previously seen cancer in the left breast 1:00 region.  She underwent a left breast partial mastectomy with sentinel lymph node biopsy and removal of her prior augmentation implants with placement of the left expander and right implant with reduction mastopexy by Dr. Murcia on February 3, 2023.  She did have a JOSE localization preoperatively but the device did not work and she required an ultrasound needle Localization with fluoroscopy confirmation intraoperatively.  Final pathology showed a residual 3 foci of invasive carcinoma with the largest focus measuring only 2 mm.  She had 3 negative sentinel lymph nodes and margins were all negative.  This remained a pathologic prognostic stage IA breast cancer.  She did follow-up with Dr. Horn in [place] and was switched to Kadcyla postoperatively.  She underwent external beam radiation to the left breast by Dr. Mayi Church in [facility] which finished on 2023.  She still has her left expander in place which will be left until she finishes the Kadcyla in 2024.  She comes in now for routine follow-up.

## 2023-12-16 NOTE — REASON FOR VISIT
[Follow-Up: _____] : a [unfilled] follow-up visit [FreeTextEntry1] : The patient comes in and is of [place] descent with a family history of breast cancer and a history of augmentation implants placed back in 2009.  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  She had a lower left axillary lymph node with cortical thickening which was biopsied and negative.  She underwent neoadjuvant HER2 based chemotherapy with a Dr. Horn in [place] and follow-up MRI showed interval resolution of the previous biopsy cancer in her left breast 1:00 region.  She was seen by Dr. Murcia and underwent a left breast partial mastectomy and sentinel lymph node biopsy with removal of her augmentation implants and placement of a right implant and left expander performed on February 3, 2023.  Final pathology did show a residual focus of cancer with the largest area measuring 2 mm and she had 3 negative sentinel lymph nodes and margins were all free.  This was a pathologic prognostic stage IA breast cancer after neoadjuvant chemotherapy.  She was placed on Kadcyla by her medical oncologist postoperatively and received external beam radiation with Dr. Mayi Church at [facility].  She comes in now for routine follow-up.

## 2023-12-16 NOTE — PHYSICAL EXAM
[Normocephalic] : normocephalic [Atraumatic] : atraumatic [EOMI] : extra ocular movement intact [Supple] : supple [No Supraclavicular Adenopathy] : no supraclavicular adenopathy [No Cervical Adenopathy] : no cervical adenopathy [Examined in the supine and seated position] : examined in the supine and seated position [No dominant masses] : no dominant masses in right breast  [No dominant masses] : no dominant masses left breast [No Nipple Retraction] : no left nipple retraction [No Nipple Discharge] : no left nipple discharge [Breast Mass Right Breast ___cm] : no masses [Breast Mass Left Breast ___cm] : no masses [No Axillary Lymphadenopathy] : no left axillary lymphadenopathy [No Edema] : no edema [No Rashes] : no rashes [No Ulceration] : no ulceration [Breast Nipple Inversion] : nipples not inverted [Breast Nipple Retraction] : nipples not retracted [Breast Nipple Flattening] : nipples not flattened [Breast Nipple Fissures] : nipples not fissured [Breast Abnormal Lactation (Galactorrhea)] : no galactorrhea [Breast Abnormal Secretion Bloody Fluid] : no bloody discharge [Breast Abnormal Secretion Serous Fluid] : no serous discharge [de-identified] : On exam, the patient has done well from her left breast partial mastectomy and sentinel lymph node biopsy with bilateral reduction mastopexy and exchange of her saline implants for a new right breast implant and left breast expander performed in February 2023.  Her wounds of healed well and she has no evidence of recurrence in the left breast and has an excellent result after external beam radiation.  She has no axillary, supraclavicular, or cervical adenopathy. [Breast Abnormal Secretion Opalescent Fluid] : no milky discharge [de-identified] : Status post reduction mastopexy with removal of saline augmentation implant and replacement of silicone implant.  No suspicious findings. [de-identified] : Status post partial mastectomy with sentinel lymph node biopsy and removal of saline augmentation implant with replacement of expander.  No evidence of recurrence over the expander which she needs to leave in place till after her Kadcyla is finished in January 2024.

## 2023-12-18 NOTE — PHYSICAL EXAM
Patient called in regards to PA. Informed patient that this has been filled out. This was refaxed to [facility] and number was provided to patient for the number that is being used.     No further questions or concerns were noted.    [Normocephalic] : normocephalic [Atraumatic] : atraumatic [EOMI] : extra ocular movement intact [Supple] : supple [No Supraclavicular Adenopathy] : no supraclavicular adenopathy [No Cervical Adenopathy] : no cervical adenopathy [No dominant masses] : no dominant masses in right breast  [Examined in the supine and seated position] : examined in the supine and seated position [No dominant masses] : no dominant masses left breast [No Nipple Retraction] : no left nipple retraction [No Nipple Discharge] : no left nipple discharge [Breast Mass Right Breast ___cm] : no masses [Breast Mass Left Breast ___cm] : no masses [Breast Nipple Inversion] : nipples not inverted [Breast Nipple Retraction] : nipples not retracted [Breast Nipple Flattening] : nipples not flattened [Breast Abnormal Lactation (Galactorrhea)] : no galactorrhea [Breast Nipple Fissures] : nipples not fissured [Breast Abnormal Secretion Bloody Fluid] : no bloody discharge [Breast Abnormal Secretion Opalescent Fluid] : no milky discharge [Breast Abnormal Secretion Serous Fluid] : no serous discharge [No Axillary Lymphadenopathy] : no left axillary lymphadenopathy [No Edema] : no edema [No Rashes] : no rashes [No Ulceration] : no ulceration [de-identified] : On exam, the patient has done well from her left breast partial mastectomy and sentinel lymph node biopsy with bilateral reduction mastopexy and exchange of her saline implants for a new right breast implant and left breast expander performed in February 2023.  Her wounds of healed well and she has no evidence of recurrence in the left breast.  She has no axillary, supraclavicular, or cervical adenopathy. [de-identified] : Status post reduction mastopexy with removal of saline augmentation implant and replacement of silicone implant. [de-identified] : Status post partial mastectomy with sentinel lymph node biopsy and removal of saline augmentation implant with replacement of expander. ???? exchange of expander ??????

## 2023-12-21 ENCOUNTER — APPOINTMENT (OUTPATIENT)
Dept: BREAST CENTER | Facility: CLINIC | Age: 58
End: 2023-12-21
Payer: COMMERCIAL

## 2023-12-21 VITALS
HEART RATE: 104 BPM | SYSTOLIC BLOOD PRESSURE: 159 MMHG | WEIGHT: 107 LBS | DIASTOLIC BLOOD PRESSURE: 71 MMHG | HEIGHT: 59 IN | OXYGEN SATURATION: 99 % | BODY MASS INDEX: 21.57 KG/M2

## 2023-12-21 PROCEDURE — 99213 OFFICE O/P EST LOW 20 MIN: CPT

## 2023-12-21 NOTE — PHYSICAL EXAM
[Normocephalic] : normocephalic [Atraumatic] : atraumatic [EOMI] : extra ocular movement intact [Supple] : supple [No Supraclavicular Adenopathy] : no supraclavicular adenopathy [No Cervical Adenopathy] : no cervical adenopathy [Examined in the supine and seated position] : examined in the supine and seated position [No dominant masses] : no dominant masses in right breast  [No dominant masses] : no dominant masses left breast [No Nipple Retraction] : no left nipple retraction [No Nipple Discharge] : no right nipple discharge [Breast Mass Right Breast ___cm] : no masses [Breast Mass Left Breast ___cm] : no masses [No Axillary Lymphadenopathy] : no left axillary lymphadenopathy [No Rashes] : no rashes [No Edema] : no edema [No Ulceration] : no ulceration [Breast Nipple Inversion] : nipples not inverted [Breast Nipple Retraction] : nipples not retracted [Breast Nipple Flattening] : nipples not flattened [Breast Nipple Fissures] : nipples not fissured [Breast Abnormal Lactation (Galactorrhea)] : no galactorrhea [Breast Abnormal Secretion Bloody Fluid] : no bloody discharge [Breast Abnormal Secretion Serous Fluid] : no serous discharge [Breast Abnormal Secretion Opalescent Fluid] : no milky discharge [de-identified] : On exam, the patient has done well from her left breast partial mastectomy and sentinel lymph node biopsy with bilateral reduction mastopexy and exchange of her saline implants for a new right breast implant and left breast expander performed in February 2023.  She has no evidence of recurrence in the left breast and has an excellent result after external beam radiation.  She has no axillary, supraclavicular, or cervical adenopathy. [de-identified] : Status post reduction mastopexy with removal of saline augmentation implant and replacement of silicone implant.  No suspicious findings. [de-identified] : Status post partial mastectomy with sentinel lymph node biopsy and removal of saline augmentation implant with replacement of expander.  No evidence of recurrence over the expander which is being exchanged for an implant in February 2024.

## 2023-12-21 NOTE — ASSESSMENT
[FreeTextEntry1] : The patient is a 58-year-old  postmenopausal white female of Lithuanian, Ch[place], Finnish descent. She has no history of any hormone replacement therapy and underwent menopause at age 50. She underwent menarche at age 12 and had her first child at age 28. Her mother had breast cancer at age 48 and tested BRCA negative. She has a history of bilateral augmentation implants placed back in . The patient herself was found to have dense breasts on screening mammography on 2022 but ultrasound showed a suspicious left breast 1:00 irregular density adjacent to the implant of the capsule measuring 1.4 x 0.5 cm 5 cm from the nipple. She underwent an ultrasound-guided core biopsy on 2022 at [facility] which showed a moderately differentiated invasive duct cancer which was ER weakly positive at 10%, MA negative and HER2 3+ by IHC with a Ki-67 between 75 and 85%. She then underwent an MRI on 2022 which showed some enhancement around the biopsy site measuring about 1.5 cm and questionable reactive lymph nodes in the left axilla. She underwent a directed ultrasound on September 15, 2022 showing an indeterminant left axillary lymph node with some cortical thickening and she underwent an ultrasound-guided core biopsy on September 15, 2022 which was negative for metastatic disease. The patient underwent comprehensive genetic panel testing which was negative and was seen by Dr. Bobby Horn in [facility] for a hematology/oncology evaluation. I reviewed her imaging on CD-ROM and indeed she did have this irregular lobulated density on ultrasound sitting directly over the muscle and capsule of the left implant. MRI showed this to be localized and the lymph node biopsy turned out to be benign. I saw the patient in 2022 and felt that she would be a good candidate for a neoadjuvant approach and then possible wide excision and exchange of the implant all performed through an inframammary incision. I spoke to the oncologist in [place] and they agreed to a neoadjuvant Herceptin based chemotherapy approach which she finished in 2023. She had a follow-up MRI at [facility] on 2023 showing resolution of the known cancer in the left breast 1:00 region. She underwent a left breast partial mastectomy with sentinel lymph node biopsy and removal of her prior augmentation saline implants with placement of the left expander and right implant with reduction mastopexy by Dr. Murcia on February 3, 2023. She did have a JOSE localization preoperatively but the device did not work and she required an ultrasound needle Localization with fluoroscopy confirmation intraoperatively. I did place fiducials around the area of the known cancer posteriorly at the time of the surgery. Final pathology showed a residual 3 foci of invasive carcinoma with the largest focus measuring only 2 mm. She had 3 negative sentinel lymph nodes and margins were all negative. This remained a pathologic prognostic stage IA breast cancer. The patient did follow-up with her medical oncologist, Dr. Horn, in [place] for continued adjuvant HER2 directed therapy and received Kadcyla. She understood the need for a radiation oncology evaluation postoperatively and she received external beam radiation with Dr. Mayi Church at [facility] which finished on 2023.  She underwent her last bilateral mammography and ultrasound which was reviewed from 2023 performed at [facility] which showed heterogeneous dense breasts with postsurgical changes bilaterally and ultrasound showed some small lymph nodes in the right axillary tail with some slight cortical thickening in one of the nodes for which diagnostic directed right axillary ultrasound is being recommended in 6 months. On exam today, she has healed well from her left breast partial mastectomy and sentinel lymph node biopsy with bilateral reduction mastopexies and replacement of expander on the left and implant on the right and she has no evidence of recurrence with an excellent cosmetic result after external beam radiation. She finished her Kadcyla in 2023 and has seen Dr. Murcia was planning exchange of the expander around 2024.  She is currently on anastrozole.  I would like to see her again in 6 months for routine follow-up.  She does have some chronic axillary scarring with some mild decreased range of motion I would like her to start physical therapy for range of motion and scar treatment.  She will be due for a follow-up diagnostic right axillary ultrasound around 2024 and was given a prescription and her next routine bilateral mammography and ultrasound will be due in 2024 and she was given prescriptions.

## 2023-12-21 NOTE — REASON FOR VISIT
[Follow-Up: _____] : a [unfilled] follow-up visit [FreeTextEntry1] : The patient comes in and is of [place] descent with a family history of breast cancer and a history of augmentation implants placed back in 2009.  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  She had a lower left axillary lymph node with cortical thickening which was biopsied and negative.  She underwent neoadjuvant HER2 based chemotherapy with a Dr. Horn in [place] and follow-up MRI showed interval resolution of the previous biopsy cancer in her left breast 1:00 region.  She was seen by Dr. Murcia and underwent a left breast partial mastectomy and sentinel lymph node biopsy with removal of her augmentation implants and placement of a right implant and left expander performed on February 3, 2023.  Final pathology did show a residual focus of cancer with the largest area measuring 2 mm and she had 3 negative sentinel lymph nodes and margins were all free.  This was a pathologic prognostic stage IA breast cancer after neoadjuvant chemotherapy.  She was placed on Kadcyla by her medical oncologist postoperatively and received external beam radiation with Dr. Mayi Church at [facility].  She comes in now for routine follow-up.

## 2023-12-21 NOTE — HISTORY OF PRESENT ILLNESS
[FreeTextEntry1] : The patient is a 58-year-old  postmenopausal white female of [place], [place], [place] sor[place] descent.  She has no history of any hormone replacement therapy and underwent menopause at age 50.  She underwent menarche at age 12 and had her first child at age 28.  Her mother had breast cancer at age 48 and tested BRCA negative.  She has a history of bilateral augmentation implants placed back in .  The patient herself was found to have dense breast on screening mammography on 2022 but ultrasound showed a suspicious left breast 1:00 irregular density adjacent to the implant of the capsule measuring 1.4 x 0.5 cm 5 cm from the nipple.  She underwent an ultrasound-guided core biopsy on 2022 at [facility] which showed a moderately differentiated invasive duct cancer which was ER weakly positive at 10%, NM negative and HER2 3+ by IHC with a Ki-67 between 75 and 85%.  She then underwent an MRI on 2022 which showed some enhancement around the biopsy site measuring about 1.5 cm and questionable reactive lymph nodes in the left axilla.  She underwent a directed ultrasound on September 15, 2022 showing an indeterminant left axillary lymph node with some cortical thickening and she underwent an ultrasound-guided core biopsy on September 15, 2022 which was negative for metastatic disease.  The patient underwent comprehensive genetic testing which was negative. She underwent neoadjuvant Herceptin based chemotherapy with Dr. Bobby Horn in [facility] which finished in 2023.  Follow-up MRI performed at [facility] on 2023 showed resolution of the previously seen cancer in the left breast 1:00 region.  She underwent a left breast partial mastectomy with sentinel lymph node biopsy and removal of her prior augmentation implants with placement of the left expander and right implant with reduction mastopexy by Dr. Murcia on February 3, 2023.  She did have a JOSE localization preoperatively but the device did not work and she required an ultrasound needle Localization with fluoroscopy confirmation intraoperatively.  Final pathology showed a residual 3 foci of invasive carcinoma with the largest focus measuring only 2 mm.  She had 3 negative sentinel lymph nodes and margins were all negative.  This remained a pathologic prognostic stage IA breast cancer.  She did follow-up with Dr. Horn in [place] and was switched to Kadcyla which finished in 2024 and she was placed on anastrozole.  She underwent external beam radiation to the left breast by Dr. Mayi Church in [facility] which finished on 2023.  She still has her left expander in place which will be exchanged by Dr. Murcia around 2024.  She comes in now for routine follow-up.

## 2024-05-21 ENCOUNTER — NON-APPOINTMENT (OUTPATIENT)
Age: 59
End: 2024-05-21

## 2024-06-04 NOTE — ASSESSMENT
[FreeTextEntry1] : The patient is a 59-year-old  postmenopausal white female of [place], Lithuanian, [place] descent. She has no history of any hormone replacement therapy and underwent menopause at age 50. She underwent menarche at age 12 and had her first child at age 28. Her mother had breast cancer at age 48 and tested BRCA negative. She has a history of bilateral augmentation implants placed back in . The patient herself was found to have dense breasts on screening mammography on 2022 but ultrasound showed a suspicious left breast 1:00 irregular density adjacent to the implant of the capsule measuring 1.4 x 0.5 cm 5 cm from the nipple. She underwent an ultrasound-guided core biopsy on 2022 at [facility] which showed a moderately differentiated invasive duct cancer which was ER weakly positive at 10%, GA negative and HER2 3+ by IHC with a Ki-67 between 75 and 85%. She then underwent an MRI on 2022 which showed some enhancement around the biopsy site measuring about 1.5 cm and questionable reactive lymph nodes in the left axilla. She underwent a directed ultrasound on September 15, 2022 showing an indeterminant left axillary lymph node with some cortical thickening and she underwent an ultrasound-guided core biopsy on September 15, 2022 which was negative for metastatic disease. The patient underwent comprehensive genetic panel testing which was negative and was seen by Dr. Bobby Horn in [facility] for a hematology/oncology evaluation. I reviewed her imaging on CD-ROM and indeed she did have this irregular lobulated density on ultrasound sitting directly over the muscle and capsule of the left implant. MRI showed this to be localized and the lymph node biopsy turned out to be benign. I saw the patient in 2022 and felt that she would be a good candidate for a neoadjuvant approach and then possible wide excision and exchange of the implant all performed through an inframammary incision. I spoke to the oncologist in [place] and they agreed to a neoadjuvant Herceptin based chemotherapy approach which she finished in 2023. She had a follow-up MRI at [facility] on 2023 showing resolution of the known cancer in the left breast 1:00 region. She underwent a left breast partial mastectomy with sentinel lymph node biopsy and removal of her prior augmentation saline implants with placement of the left expander and right implant with reduction mastopexy by Dr. Murcia on February 3, 2023. She did have a JOSE localization preoperatively but the device did not work and she required an ultrasound needle Localization with fluoroscopy confirmation intraoperatively. I did place fiducials around the area of the known cancer posteriorly at the time of the surgery. Final pathology showed a residual 3 foci of invasive carcinoma with the largest focus measuring only 2 mm. She had 3 negative sentinel lymph nodes and margins were all negative. This remained a pathologic prognostic stage IA breast cancer. The patient did follow-up with her medical oncologist, Dr. Horn, in [place] for continued adjuvant HER2 directed therapy and received Kadcyla. She understood the need for a radiation oncology evaluation postoperatively and she received external beam radiation with Dr. Mayi Church at [facility] which finished on 2023.  She underwent her last bilateral mammography and ultrasound which was reviewed from ?????? 2023 performed at [facility] which showed heterogeneous dense breasts with postsurgical changes bilaterally and ultrasound showed some small lymph nodes in the right axillary tail with some slight cortical thickening in one of the nodes for which diagnostic directed right axillary ultrasound was being recommended in 6 months ??????. On exam today, she has healed well from her left breast partial mastectomy and sentinel lymph node biopsy with bilateral reduction mastopexies and placement of an expander on the left with later exchange for an implant and implant on the right and she has no evidence of recurrence with an excellent cosmetic result after external beam radiation. She finished her Kadcyla in 2023 and underwent her expander exchange for an implant on the left around 2024.  She is currently on anastrozole.  I would like to see her again in 6 months for routine follow-up.  She did have some chronic axillary scarring with some mild decreased range of motion and I had her undergo physical therapy for range of motion and scar treatment.  She will be due for a follow-up diagnostic right axillary ultrasound around 2024 ???????and was given a prescription and her next routine bilateral mammography and ultrasound will be due in 2024 and she was given prescriptions.

## 2024-06-04 NOTE — REASON FOR VISIT
[Follow-Up: _____] : a [unfilled] follow-up visit [FreeTextEntry1] : The patient comes in and is of [place] Stat[place] descent with a family history of breast cancer and a history of augmentation implants placed back in 2009.  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  She had a lower left axillary lymph node with cortical thickening which was biopsied and negative.  She underwent neoadjuvant HER2 based chemotherapy with a Dr. Horn in [place] and follow-up MRI showed interval resolution of the previous biopsy cancer in her left breast 1:00 region.  She was seen by Dr. Murcia and underwent a left breast partial mastectomy and sentinel lymph node biopsy with removal of her augmentation implants and placement of a right implant and left expander performed on February 3, 2023.  Final pathology did show a residual focus of cancer with the largest area measuring 2 mm and she had 3 negative sentinel lymph nodes and margins were all free.  This was a pathologic prognostic stage IA breast cancer after neoadjuvant chemotherapy.  She was placed on Kadcyla by her medical oncologist postoperatively and received external beam radiation with Dr. Mayi Church at [facility].  She comes in now for routine follow-up.

## 2024-06-04 NOTE — PHYSICAL EXAM
[Normocephalic] : normocephalic [Atraumatic] : atraumatic [EOMI] : extra ocular movement intact [Supple] : supple [No Supraclavicular Adenopathy] : no supraclavicular adenopathy [No Cervical Adenopathy] : no cervical adenopathy [Examined in the supine and seated position] : examined in the supine and seated position [No dominant masses] : no dominant masses in right breast  [No dominant masses] : no dominant masses left breast [No Nipple Retraction] : no left nipple retraction [No Nipple Discharge] : no left nipple discharge [Breast Mass Right Breast ___cm] : no masses [Breast Mass Left Breast ___cm] : no masses [No Axillary Lymphadenopathy] : no left axillary lymphadenopathy [No Edema] : no edema [No Rashes] : no rashes [No Ulceration] : no ulceration [Breast Nipple Inversion] : nipples not inverted [Breast Nipple Retraction] : nipples not retracted [Breast Nipple Flattening] : nipples not flattened [Breast Nipple Fissures] : nipples not fissured [Breast Abnormal Lactation (Galactorrhea)] : no galactorrhea [Breast Abnormal Secretion Bloody Fluid] : no bloody discharge [Breast Abnormal Secretion Serous Fluid] : no serous discharge [Breast Abnormal Secretion Opalescent Fluid] : no milky discharge [de-identified] : On exam, the patient has done well from her left breast partial mastectomy and sentinel lymph node biopsy with bilateral reduction mastopexy and exchange of her saline implants for a new right breast implant and left breast expander performed in February 2023.  She has no evidence of recurrence in the left breast and has an excellent result after external beam radiation.  She has no axillary, supraclavicular, or cervical adenopathy. [de-identified] : Status post reduction mastopexy with removal of saline augmentation implant and replacement of silicone implant.  No suspicious findings. [de-identified] : Status post partial mastectomy with sentinel lymph node biopsy and removal of saline augmentation implant with replacement of expander.  No evidence of recurrence over the expander which is being exchanged for an implant in February 2024.

## 2024-06-04 NOTE — HISTORY OF PRESENT ILLNESS
[FreeTextEntry1] : The patient is a 59-year-old  postmenopausal white female of [place], [place], [place] descent.  She has no history of any hormone replacement therapy and underwent menopause at age 50.  She underwent menarche at age 12 and had her first child at age 28.  Her mother had breast cancer at age 48 and tested BRCA negative.  She has a history of bilateral augmentation implants placed back in .  The patient herself was found to have dense breast on screening mammography on 2022 but ultrasound showed a suspicious left breast 1:00 irregular density adjacent to the implant of the capsule measuring 1.4 x 0.5 cm 5 cm from the nipple.  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  She then underwent an MRI on 2022 which showed some enhancement around the biopsy site measuring about 1.5 cm and questionable reactive lymph nodes in the left axilla.  She underwent a directed ultrasound on September 15, 2022 showing an indeterminant left axillary lymph node with some cortical thickening and she underwent an ultrasound-guided core biopsy on September 15, 2022 which was negative for metastatic disease.  The patient underwent comprehensive genetic testing which was negative. She underwent neoadjuvant Herceptin based chemotherapy with Dr. Bobby Horn in [facility] which finished in 2023.  Follow-up MRI performed at [facility] on 2023 showed resolution of the previously seen cancer in the left breast 1:00 region.  She underwent a left breast partial mastectomy with sentinel lymph node biopsy and removal of her prior augmentation implants with placement of the left expander and right implant with reduction mastopexy by Dr. Murcia on February 3, 2023.  She did have a JOSE localization preoperatively but the device did not work and she required an ultrasound needle Localization with fluoroscopy confirmation intraoperatively.  Final pathology showed a residual 3 foci of invasive carcinoma with the largest focus measuring only 2 mm.  She had 3 negative sentinel lymph nodes and margins were all negative.  This remained a pathologic prognostic stage IA breast cancer.  She did follow-up with Dr. Horn in [place] and was switched to Kadcyla which finished in 2024 and she was placed on anastrozole.  She underwent external beam radiation to the left breast by Dr. Mayi Church in [facility] which finished on 2023.  She had exchange of her left expander for an implant by Dr. Murcia around 2024.  She comes in now for routine follow-up.

## 2024-06-10 ENCOUNTER — NON-APPOINTMENT (OUTPATIENT)
Age: 59
End: 2024-06-10

## 2024-06-11 ENCOUNTER — APPOINTMENT (OUTPATIENT)
Dept: BREAST CENTER | Facility: CLINIC | Age: 59
End: 2024-06-11
Payer: COMMERCIAL

## 2024-06-11 VITALS
HEIGHT: 59 IN | BODY MASS INDEX: 22.18 KG/M2 | DIASTOLIC BLOOD PRESSURE: 71 MMHG | SYSTOLIC BLOOD PRESSURE: 109 MMHG | WEIGHT: 110 LBS | HEART RATE: 82 BPM | OXYGEN SATURATION: 100 %

## 2024-06-11 DIAGNOSIS — N60.12 DIFFUSE CYSTIC MASTOPATHY OF LEFT BREAST: ICD-10-CM

## 2024-06-11 DIAGNOSIS — Z80.3 FAMILY HISTORY OF MALIGNANT NEOPLASM OF BREAST: ICD-10-CM

## 2024-06-11 DIAGNOSIS — Z85.3 PERSONAL HISTORY OF MALIGNANT NEOPLASM OF BREAST: ICD-10-CM

## 2024-06-11 DIAGNOSIS — Z12.39 ENCOUNTER FOR OTHER SCREENING FOR MALIGNANT NEOPLASM OF BREAST: ICD-10-CM

## 2024-06-11 DIAGNOSIS — N60.11 DIFFUSE CYSTIC MASTOPATHY OF LEFT BREAST: ICD-10-CM

## 2024-06-11 DIAGNOSIS — Z90.12 ACQUIRED ABSENCE OF LEFT BREAST AND NIPPLE: ICD-10-CM

## 2024-06-11 PROCEDURE — 99213 OFFICE O/P EST LOW 20 MIN: CPT

## 2024-06-11 NOTE — PHYSICAL EXAM
[Normocephalic] : normocephalic [Atraumatic] : atraumatic [EOMI] : extra ocular movement intact [Supple] : supple [No Supraclavicular Adenopathy] : no supraclavicular adenopathy [No Cervical Adenopathy] : no cervical adenopathy [Examined in the supine and seated position] : examined in the supine and seated position [No dominant masses] : no dominant masses in right breast  [No dominant masses] : no dominant masses left breast [No Nipple Retraction] : no left nipple retraction [No Nipple Discharge] : no left nipple discharge [Breast Mass Right Breast ___cm] : no masses [Breast Mass Left Breast ___cm] : no masses [No Axillary Lymphadenopathy] : no left axillary lymphadenopathy [No Edema] : no edema [No Rashes] : no rashes [No Ulceration] : no ulceration [Breast Nipple Inversion] : nipples not inverted [Breast Nipple Retraction] : nipples not retracted [Breast Nipple Flattening] : nipples not flattened [Breast Nipple Fissures] : nipples not fissured [Breast Abnormal Lactation (Galactorrhea)] : no galactorrhea [Breast Abnormal Secretion Bloody Fluid] : no bloody discharge [Breast Abnormal Secretion Serous Fluid] : no serous discharge [Breast Abnormal Secretion Opalescent Fluid] : no milky discharge [de-identified] : On exam, the patient did well from her left breast partial mastectomy and sentinel lymph node biopsy with bilateral reduction mastopexy and exchange of her saline implants for a new right breast implant and left breast expander performed in February 2023.  She had radiation over the left breast/expander.  She had exchange of her left expander for an implant with revision of her bilateral reduction mastopexies in April 2024.  She has no evidence of recurrence in the left breast.  Her right breast is larger than the left.  She has no axillary, supraclavicular, or cervical adenopathy. [de-identified] : Status post reduction mastopexy with removal of saline augmentation implant and replacement of silicone implant.  No suspicious findings. [de-identified] : Status post partial mastectomy with sentinel lymph node biopsy and removal of saline augmentation implant with replacement of expander and then radiation therapy.  She then underwent exchange of her expander for a formal implant in April 2024 and she has no evidence of any recurrence.

## 2024-06-11 NOTE — ASSESSMENT
[FreeTextEntry1] : The patient is a 59-year-old  postmenopausal white female of [place], Palestinian, [place] descent. She has no history of any hormone replacement therapy and underwent menopause at age 50. She underwent menarche at age 12 and had her first child at age 28. Her mother had breast cancer at age 48 and tested BRCA negative. She has a history of bilateral augmentation implants placed back in . The patient herself was found to have dense breasts on screening mammography on 2022 but ultrasound showed a suspicious left breast 1:00 irregular density adjacent to the implant of the capsule measuring 1.4 x 0.5 cm 5 cm from the nipple. 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 She then underwent an MRI on 2022 which showed some enhancement around the biopsy site measuring about 1.5 cm and questionable reactive lymph nodes in the left axilla. She underwent a directed ultrasound on September 15, 2022 showing an indeterminant left axillary lymph node with some cortical thickening and she underwent an ultrasound-guided core biopsy on September 15, 2022 which was negative for metastatic disease. The patient underwent comprehensive genetic panel testing which was negative and was seen by Dr. Bobby Horn in [facility] for a hematology/oncology evaluation. I reviewed her imaging on CD-ROM and indeed she did have this irregular lobulated density on ultrasound sitting directly over the muscle and capsule of the left implant. MRI showed this to be localized and the lymph node biopsy turned out to be benign. I saw the patient in 2022 and felt that she would be a good candidate for a neoadjuvant approach and then possible wide excision and exchange of the implant all performed through an inframammary incision. I spoke to the oncologist in [place] and they agreed to a neoadjuvant Herceptin based chemotherapy approach which she finished in 2023. She had a follow-up MRI at [facility] on 2023 showing resolution of the known cancer in the left breast 1:00 region. She underwent a left breast partial mastectomy with sentinel lymph node biopsy and removal of her prior augmentation saline implants with placement of the left expander and right implant with reduction mastopexy by Dr. Murcia on February 3, 2023. She did have a JOSE localization preoperatively but the device did not work and she required an ultrasound needle Localization with fluoroscopy confirmation intraoperatively. I did place fiducials around the area of the known cancer posteriorly at the time of the surgery. Final pathology showed a residual 3 foci of invasive carcinoma with the largest focus measuring only 2 mm. She had 3 negative sentinel lymph nodes and margins were all negative. This remained a pathologic prognostic stage IA breast cancer. The patient did follow-up with her medical oncologist, Dr. Horn, in [place] for continued adjuvant HER2 directed therapy and received Kadcyla. She understood the need for a radiation oncology evaluation postoperatively and she received external beam radiation with Dr. Mayi Church at [facility] which finished on 2023.  She underwent her last bilateral mammography and ultrasound which was reviewed from 2023 performed at [facility] which showed heterogeneous dense breasts with postsurgical changes bilaterally and ultrasound showed some small lymph nodes in the right axillary tail with some slight cortical thickening in one of the nodes for which diagnostic directed right axillary ultrasound was performed at [facility] which the patient tells me was unchanged but I am trying to get the official report. She finished the Kadcyla in 2023 and was placed on anastrozole.  The patient did undergo exchange of her left breast tissue expander for an implant with revision of her bilateral reduction mastopexies by Dr. Murcia on 2024.  On exam today, she has healed well from her left breast partial mastectomy and sentinel lymph node biopsy and now with exchange of her tissue expander for an implant performed in 2024 with revision of her reduction mastopexies.  She has no evidence of recurrence in her left breast and no suspicious findings in the right breast.  Her right breast is definitely larger than the left and this is likely due to her radiation on the left.  I would like to see her again in 6 months for routine follow-up.  She did have some chronic axillary scarring with some mild decreased range of motion and I had her undergo physical therapy for range of motion and scar treatment and she would like to undergo more physical therapy and she was given a prescription.  Her next routine bilateral mammography and ultrasound will be due in 2024 and she was given prescriptions.  She should continue to follow-up with Dr. Horn and remain on anastrozole.

## 2024-06-11 NOTE — REASON FOR VISIT
[Follow-Up: _____] : a [unfilled] follow-up visit [FreeTextEntry1] : The patient comes in and is of [place] descent with a family history of breast cancer and a history of augmentation implants placed back in 2009.  She was diagnosed with a left breast 1:00 pericapsular invasive duct cancer in August 2022 which was ER weakly positive MS negative and HER2 3+ with a high Ki-67.  She had a lower left axillary lymph node with cortical thickening which was biopsied and negative.  She underwent neoadjuvant HER2 based chemotherapy with a Dr. Horn in [place] and follow-up MRI showed interval resolution of the previous biopsy cancer in her left breast 1:00 region.  She was seen by Dr. Murcia and underwent a left breast partial mastectomy and sentinel lymph node biopsy with removal of her augmentation implants and placement of a right implant and left expander performed on February 3, 2023.  Final pathology did show a residual focus of cancer with the largest area measuring 2 mm and she had 3 negative sentinel lymph nodes and margins were all free.  This was a pathologic prognostic stage IA breast cancer after neoadjuvant chemotherapy.  She was placed on Kadcyla by her medical oncologist postoperatively and received external beam radiation with Dr. Mayi Church at [facility].  She was then placed on anastrozole.  She underwent exchange of her left expander for an implant by Dr. Murcia in April 2024.  She comes in now for routine follow-up.

## 2024-06-11 NOTE — HISTORY OF PRESENT ILLNESS
[FreeTextEntry1] : The patient is a 59-year-old  postmenopausal white female of [place], Far[place], English descent.  She has no history of any hormone replacement therapy and underwent menopause at age 50.  She underwent menarche at age 12 and had her first child at age 28.  Her mother had breast cancer at age 48 and tested BRCA negative.  She has a history of bilateral augmentation implants placed back in .  The patient herself was found to have dense breast on screening mammography on 2022 but ultrasound showed a suspicious left breast 1:00 irregular density adjacent to the implant of the capsule measuring 1.4 x 0.5 cm 5 cm from the nipple.  She underwent an ultrasound-guided core biopsy on 2022 at [facility] which showed a moderately differentiated invasive duct cancer which was ER weakly positive at 10%, NV negative and HER2 3+ by IHC with a Ki-67 between 75 and 85%.  She then underwent an MRI on 2022 which showed some enhancement around the biopsy site measuring about 1.5 cm and questionable reactive lymph nodes in the left axilla.  She underwent a directed ultrasound on September 15, 2022 showing an indeterminant left axillary lymph node with some cortical thickening and she underwent an ultrasound-guided core biopsy on September 15, 2022 which was negative for metastatic disease.  The patient underwent comprehensive genetic testing which was negative. She underwent neoadjuvant Herceptin based chemotherapy with Dr. Bobby Horn in [facility] [place] which finished in 2023.  Follow-up MRI performed at [facility] on 2023 showed resolution of the previously seen cancer in the left breast 1:00 region.  She underwent a left breast partial mastectomy with sentinel lymph node biopsy and removal of her prior augmentation implants with placement of the left expander and right implant with reduction mastopexy by Dr. Murcia on February 3, 2023.  She did have a JOSE localization preoperatively but the device did not work and she required an ultrasound needle Localization with fluoroscopy confirmation intraoperatively.  Final pathology showed a residual 3 foci of invasive carcinoma with the largest focus measuring only 2 mm.  She had 3 negative sentinel lymph nodes and margins were all negative.  This remained a pathologic prognostic stage IA breast cancer.  She did follow-up with Dr. Horn in [place] and was switched to Kadcyla which finished in 2024 and she was placed on anastrozole.  She underwent external beam radiation to the left breast by Dr. Mayi Church in [facility] which finished on 2023.  She had exchange of her left expander for an implant by Dr. Murcia and revision of her bilateral reduction mastopexies which was performed in 2024.  She comes in now for routine follow-up.

## 2024-11-19 ENCOUNTER — NON-APPOINTMENT (OUTPATIENT)
Age: 59
End: 2024-11-19

## 2024-12-10 ENCOUNTER — APPOINTMENT (OUTPATIENT)
Dept: BREAST CENTER | Facility: CLINIC | Age: 59
End: 2024-12-10
Payer: COMMERCIAL

## 2024-12-10 VITALS
HEART RATE: 86 BPM | BODY MASS INDEX: 22.78 KG/M2 | SYSTOLIC BLOOD PRESSURE: 114 MMHG | WEIGHT: 113 LBS | OXYGEN SATURATION: 100 % | DIASTOLIC BLOOD PRESSURE: 72 MMHG | HEIGHT: 59 IN

## 2024-12-10 DIAGNOSIS — N60.12 DIFFUSE CYSTIC MASTOPATHY OF LEFT BREAST: ICD-10-CM

## 2024-12-10 DIAGNOSIS — Z80.3 FAMILY HISTORY OF MALIGNANT NEOPLASM OF BREAST: ICD-10-CM

## 2024-12-10 DIAGNOSIS — N60.11 DIFFUSE CYSTIC MASTOPATHY OF LEFT BREAST: ICD-10-CM

## 2024-12-10 DIAGNOSIS — I89.0 LYMPHEDEMA, NOT ELSEWHERE CLASSIFIED: ICD-10-CM

## 2024-12-10 DIAGNOSIS — Z90.12 ACQUIRED ABSENCE OF LEFT BREAST AND NIPPLE: ICD-10-CM

## 2024-12-10 DIAGNOSIS — Z85.3 PERSONAL HISTORY OF MALIGNANT NEOPLASM OF BREAST: ICD-10-CM

## 2024-12-10 DIAGNOSIS — Z12.39 ENCOUNTER FOR OTHER SCREENING FOR MALIGNANT NEOPLASM OF BREAST: ICD-10-CM

## 2024-12-10 PROCEDURE — 99213 OFFICE O/P EST LOW 20 MIN: CPT

## 2024-12-20 ENCOUNTER — TRANSCRIPTION ENCOUNTER (OUTPATIENT)
Age: 59
End: 2024-12-20

## 2025-05-09 ENCOUNTER — NON-APPOINTMENT (OUTPATIENT)
Age: 60
End: 2025-05-09

## 2025-06-19 ENCOUNTER — APPOINTMENT (OUTPATIENT)
Dept: BREAST CENTER | Facility: CLINIC | Age: 60
End: 2025-06-19
Payer: COMMERCIAL

## 2025-06-19 ENCOUNTER — NON-APPOINTMENT (OUTPATIENT)
Age: 60
End: 2025-06-19

## 2025-06-19 VITALS
HEART RATE: 90 BPM | DIASTOLIC BLOOD PRESSURE: 74 MMHG | OXYGEN SATURATION: 98 % | HEIGHT: 59 IN | BODY MASS INDEX: 23.99 KG/M2 | WEIGHT: 119 LBS | SYSTOLIC BLOOD PRESSURE: 114 MMHG

## 2025-06-19 PROCEDURE — 99213 OFFICE O/P EST LOW 20 MIN: CPT
